# Patient Record
Sex: FEMALE | Race: WHITE | URBAN - METROPOLITAN AREA
[De-identification: names, ages, dates, MRNs, and addresses within clinical notes are randomized per-mention and may not be internally consistent; named-entity substitution may affect disease eponyms.]

---

## 2022-09-07 ENCOUNTER — TELEPHONE (OUTPATIENT)
Dept: BARIATRICS/WEIGHT MGMT | Age: 67
End: 2022-09-07

## 2022-10-13 ENCOUNTER — PREP FOR PROCEDURE (OUTPATIENT)
Dept: BARIATRICS/WEIGHT MGMT | Age: 67
End: 2022-10-13

## 2022-10-13 ENCOUNTER — TELEPHONE (OUTPATIENT)
Dept: BARIATRICS/WEIGHT MGMT | Age: 67
End: 2022-10-13

## 2022-10-13 ENCOUNTER — INITIAL CONSULT (OUTPATIENT)
Dept: BARIATRICS/WEIGHT MGMT | Age: 67
End: 2022-10-13
Payer: MEDICARE

## 2022-10-13 VITALS
BODY MASS INDEX: 39.55 KG/M2 | HEIGHT: 67 IN | TEMPERATURE: 97.7 F | SYSTOLIC BLOOD PRESSURE: 203 MMHG | RESPIRATION RATE: 20 BRPM | WEIGHT: 252 LBS | HEART RATE: 64 BPM | DIASTOLIC BLOOD PRESSURE: 107 MMHG

## 2022-10-13 DIAGNOSIS — E66.01 MORBID OBESITY DUE TO EXCESS CALORIES (HCC): ICD-10-CM

## 2022-10-13 DIAGNOSIS — K30 INDIGESTION: Primary | ICD-10-CM

## 2022-10-13 DIAGNOSIS — K21.9 GASTROESOPHAGEAL REFLUX DISEASE WITHOUT ESOPHAGITIS: ICD-10-CM

## 2022-10-13 PROBLEM — I10 HTN (HYPERTENSION): Status: ACTIVE | Noted: 2022-10-13

## 2022-10-13 PROBLEM — M19.90 ARTHRITIS: Status: ACTIVE | Noted: 2022-10-13

## 2022-10-13 PROCEDURE — G8484 FLU IMMUNIZE NO ADMIN: HCPCS | Performed by: SURGERY

## 2022-10-13 PROCEDURE — G8427 DOCREV CUR MEDS BY ELIG CLIN: HCPCS | Performed by: SURGERY

## 2022-10-13 PROCEDURE — 1090F PRES/ABSN URINE INCON ASSESS: CPT | Performed by: SURGERY

## 2022-10-13 PROCEDURE — 99204 OFFICE O/P NEW MOD 45 MIN: CPT | Performed by: SURGERY

## 2022-10-13 PROCEDURE — 99202 OFFICE O/P NEW SF 15 MIN: CPT

## 2022-10-13 PROCEDURE — G8417 CALC BMI ABV UP PARAM F/U: HCPCS | Performed by: SURGERY

## 2022-10-13 RX ORDER — OMEGA-3S/DHA/EPA/FISH OIL/D3 300MG-1000
400 CAPSULE ORAL DAILY
COMMUNITY

## 2022-10-13 RX ORDER — VALSARTAN 160 MG/1
TABLET ORAL
COMMUNITY
Start: 2022-10-02

## 2022-10-13 RX ORDER — SODIUM CHLORIDE, SODIUM LACTATE, POTASSIUM CHLORIDE, CALCIUM CHLORIDE 600; 310; 30; 20 MG/100ML; MG/100ML; MG/100ML; MG/100ML
INJECTION, SOLUTION INTRAVENOUS CONTINUOUS
OUTPATIENT
Start: 2022-10-13

## 2022-10-13 RX ORDER — IBUPROFEN 800 MG/1
TABLET ORAL
COMMUNITY
Start: 2022-10-05

## 2022-10-13 RX ORDER — CLONIDINE HYDROCHLORIDE 0.1 MG/1
TABLET ORAL 2 TIMES DAILY
COMMUNITY
Start: 2021-11-28

## 2022-10-13 RX ORDER — ASPIRIN 81 MG/81MG
CAPSULE ORAL DAILY
COMMUNITY

## 2022-10-13 NOTE — TELEPHONE ENCOUNTER
Prior Authorization Form  DEMOGRAPHICS:    Patient Name:  Marc Griffiths  Patient :  1955            Insurance:  Payor: [facility] MEDICARE / Plan: Yola Edgar COMPLETE / Product Type: *No Product type* /   Insurance ID Number:    Payer/Plan Subscr  Sex Relation Sub. Ins. ID Effective Group Num   1.  [facility] MEDICARE Bertha Neat 1955 Female Self [id number] 3/1/22                                    [address]         DIAGNOSIS & PROCEDURE:    Procedure/Operation: egd           CPT Code: 97831    Diagnosis:  gerd    ICD10 Code: k21.9    Location:  [facility]    Surgeon:  Ashlyn Bull INFORMATION:    Date: 22    Time:               Anesthesia:  MAC/TIVA                                                       Status:  Outpatient        Special Comments:         Electronically signed by Dniah Silva MA on 10/13/2022 at 1:55 PM

## 2022-10-13 NOTE — PROGRESS NOTES
Juan Ramon Ardon  10/13/2022  [facility]    Initial Evaluation  History and Physical   EGD       CHIEF COMPLAINT: Morbid obesity, malnutrition, Hypertension and Hyperlipidemia, arthritis, denies acid reflux    HISTORY OF PRESENT ILLNESS: Juan Ramon Ardon is a morbidly-obese 74 y.o.  female, who weighs 252 lb (114.3 kg). She is 89 pounds over her ideal body weight. The Body mass index is 39.47 kg/m². She has multiple medical problems aggravated by her obesity. She wishes to have bariatric surgery so that she can lose a large amount of weight and keep the weight off. I have met with her in the [facility] where we discussed the surgery in great detail and went over the risks and benefits. She has watched our informational video so she understands all of the extensive risks involved. She states that she understands all of the risks and wishes to proceed with the evaluation. Weight:  252 lb 10/13/2022  initial    Past medical history:  Htn, arthritis, morbid obesity    Past Surgical History:   Procedure Laterality Date    CHOLECYSTECTOMY, LAPAROSCOPIC  2016    COLONOSCOPY  2018    HIATAL HERNIA REPAIR N/A 2019    HYSTERECTOMY, TOTAL ABDOMINAL (CERVIX REMOVED)  2011      Allergies: Patient has no known allergies. Home Medications  Prior to Visit Medications    Medication Sig Taking?  Authorizing Provider   Aspirin (VAZALORE) 81 MG CAPS Take by mouth daily Yes Historical Provider, MD   vitamin D3 (CHOLECALCIFEROL) 10 MCG (400 UNIT) TABS tablet Take 400 Units by mouth daily Yes Historical Provider, MD   cloNIDine (CATAPRES) 0.1 MG tablet 2 times daily Yes Historical Provider, MD   cyanocobalamin 100 MCG tablet Take 100 mcg by mouth daily Yes Historical Provider, MD   ibuprofen (ADVIL;MOTRIN) 800 MG tablet TAKE 1 TABLET BY MOUTH EVERY 8 HOURS AS NEEDED WITH FOOD OR MILK Yes Historical Provider, MD   valsartan (DIOVAN) 160 MG tablet TAKE 1 TABLET BY MOUTH ONCE DAILY Yes for the surgery.     Physician Signature: Electronically signed by Dr. Ritu Andersen MD    Send copy of H&P to PCPVanessa

## 2022-10-13 NOTE — PATIENT INSTRUCTIONS
What is the next step to proceed with weight loss surgery? Please be aware that any co-pays or deductibles may be requested prior to testing and / or procedures. You will need to schedule a psychological evaluation for weight loss surgery. Patients will be required to complete all psychological testing as required by the mental health provider. Patients must also follow all of the provider's recommendations before weight loss surgery can be scheduled. The evaluation must be done a standard way for weight loss surgery. We strongly recommend that you contact one of our preferred providers listed below to arrange this:      Shaunna Salinas, [facility]  [address] [place], [place]   [phone]    [facility] and [address]   [phone]    Dr. Sandra Cook, PhD    Morteza Tabares. [place], [place]    [phone]      You will also need to plan on attending a 2 hour nutrition class at the [facility] prior to your surgery. We will schedule this for you when we schedule your surgery. Please remember to have your labs drawn 10 days prior to your first scheduled dietary appointment. Please remember, that while we will submit your case to insurance for surgery authorization, it is your responsibility to know if your plan covers weight loss surgery and keep up-to-date with changes to your insurance coverage. We will do everything possible to help you get approved for weight loss surgery, but cannot guarantee an approval.     Please note that you will not be submitted to your insurance company until all pre-operative testing requirements are met.

## 2022-10-25 ENCOUNTER — INITIAL CONSULT (OUTPATIENT)
Dept: BARIATRICS/WEIGHT MGMT | Age: 67
End: 2022-10-25

## 2022-10-25 VITALS — HEIGHT: 67 IN | BODY MASS INDEX: 39.87 KG/M2 | WEIGHT: 254 LBS

## 2022-10-25 DIAGNOSIS — Z00.8 NUTRITIONAL ASSESSMENT: Primary | ICD-10-CM

## 2022-10-25 DIAGNOSIS — Z71.3 NUTRITIONAL COUNSELING: ICD-10-CM

## 2022-10-25 PROCEDURE — 99999 PR OFFICE/OUTPT VISIT,PROCEDURE ONLY: CPT | Performed by: SURGERY

## 2022-10-25 NOTE — PATIENT INSTRUCTIONS
EMILY MUN[facility]  Dietary Initial Appointment Patient Instructions    By: Deb Abad RD / GRACE  [phone]      At your initial dietary appointment you have received the following information packets:    Please be aware at each visit you have been instructed that in order for your insurance company to approve your surgery you must show a consistent weight loss of 2 lbs or greater at each visit. We can not guarantee an approval by your insurance company we can only provide the information given to us it is up to you the patient to show compliancy to your insurance company. If you do gain weight during your supervised weight loss counseling sessions insurance companies starting in 2018 are denying patients for not showing consistent weight loss results when part of a supervised weight loss counseling program. Pt was instructed on 10/25/22. Pt verbalized understanding. Low-Fat Diet  - Please be sure to begin your low-fat diet from today's date until your scheduled surgery date. If you are not at goal weight by your history and physical appointment with your surgeon your surgery will be cancelled. Goal Weight: 244 lbs BMI: 38.2  Low-Fat Diet Contract - Patient Acknowledge and Signed  Supplement List - Please be sure to be saving to purchase your 3 month supply of supplements. If you do not bring supplements to your history and physical appointment your surgery will be cancelled. Supplement Contract - Patient Acknowledge and Signed  Please be sure to take a daily Multi-vitamin from now until surgery to reduce your incidence of infection. If your insurance company requires additional dietary counseling you will be scheduled to see the dietitian the following month. If your psychological evaluation is completed and all your dietary requirements for your individual insurance company have been completed you will be submitted to insurance.  Please make sure to check with us to see if we have received your psychological evaluation. Please be aware that any co-pays or deductibles may be requested prior to testing and / or procedures. You will need to schedule a psychological evaluation for weight loss surgery. Patients will be required to complete all psychological testing as required by the psychologist. Patients must follow all of the psychologist's recommendations before weight loss surgery can be scheduled. The evaluation must be done a standard way for weight loss surgery. We strongly recommend that you contact one of our preferred providers listed below to arrange this:    José Miguel Wang, [facility]                                                              [address] [place] [address]                                                                                      [phone]     [facility]                                                                                                [phone]        Dr. Gabi Coombs, PhD                         Annabelle Hope. [facility]                                                                                              [phone]     You will also need to plan on attending a 2 hour nutrition class at the [facility] prior to your surgery. We will schedule this for you when we schedule your surgery. Please remember to have your labs drawn prior to your scheduled appointment to review the results of your testing. Please remember, that while we will submit your case to insurance for surgery authorization, it is your responsibility to know if your plan covers weight loss surgery and keep up-to-date with changes to your insurance coverage.   We will do everything possible to help you get approved for weight loss surgery, but cannot guarantee an approval.      Please note that you will not be submitted to your insurance company until all   pre-operative testing requirements are met. Please remember you need to schedule to attend one Support Group meeting held at the [facility] before surgery. This one meeting is mandatory. You can attend as many support group meetings before and after surgery. Support group meetings are held at the [facility] from 6:00 - 8:00pm 1st, and 3rd Tuesday of every month. See dates listed below. Each individual person has his / her own medical requirements that need fulfilled before being able to schedule bariatric surgery . Please finalize these requirements by contacting our Bariatric Nurse at [phone].

## 2022-10-25 NOTE — PROGRESS NOTES
[address] [address] [facility]:  Initial Nutrition Consultation    Today's Date: 10/25/2022  Patients Name:Eliana Stanley     Height: 5' 7\" (1.702 m)   Weight: 254 lb (115.2 kg)   IBW: 163 lbs  %IBW: 156%  Excess Weight: 91 lbs  BMI: Body mass index is 39.78 kg/m². Subjective Information:   Patient has tried multiple means of weight loss including diet and exercise in the past and has been unable to maintain a healthy weight. Pt states he / she has tried the following  - Atkins, Nutrisystem and Calorie Counting. Patients overall goal is to be able to lose weight with diet and exercise by changing lifestyle, habits and maintain a healthy weight for a lifetime. Are your currently Diabetic  - No    Most successful diet in the past? Calorie Counting  Length of time patient was on the diet listed above? Aug 2021  Weight lost on the diet listed above? 50 lbs  Patient stated he / she maintained his / her weight for the following time? 8 Month's    Patient takes the following vitamins, minerals and dietary supplements? No - I do not currently take a daily complete multi-vitamin. Kandice Stack / Andres Alicea has educated the patient that we recommend in order to decrease infection rates patient start a daily complete multi-vitamin from now until surgery if proceeding with surgery. Yes - I currently take a Vitamin D3, Vitamin B12 and Vitamin C - Pt is not sure the dose. Rd / Ld recommends the patient continue the following supplements from now until surgery. Patient consumes the following beverage daily? Water    Patient states he / she has the following problems:  no - Eating  no - Chewing  no - Swallowing  no - Nausea  no - Vomiting  no - Diarrhea  no - Constipation  no - Hair Changes  no - Missing Teeth  no - Wears Dentures  Food Allergies: no  -   Food Intolerances: no -     Pt identifies his / her eating habits as the following:     Eats some fruits and or vegetables daily.  Tries to eat healthy - whole wheat bread. Moderate to high intake of empty-calorie foods (sweets, salty / fatty foods). Yes - Past medically assisted weight loss history reviewed. Yes - Provided education regarding the basic role of nutrition in junction with Bariatric Surgery. Yes - Provided overview of required dietary and behavioral changes pre and post operatively. Yes - Stated / reinforced that changes in dietary behaviors are critical to successful, long term weight Loss. Patient is aware that in order to proceed with bariatric surgery he / she will need to follow a low-fat diet prior to surgery. Patient is aware that bariatric surgery is a lifetime change that will require the patient to follow a low-fat, low-calorie, low-sugar, portion controlled diet with at least 30 minutes of physical activity daily. Patient is aware that certain foods may not be tolerated after bariatric surgery and certain foods will need avoided all together. Pt is aware supplementation of certain micro nutrients is lifelong along with the use of tracking both macro and micro nutrient intake daily after weight loss surgery. Pt is part of a comprehensive surgical weight loss program that is educating the patient on proper food choices, meal planning, portion control and label reading for use both before and after weight loss surgery. Pt is able to set measurable attainable nutrition goals that reinforce desired post-operative eating patterns when it comes to family, home based, or work settings. Mindful eating skills are being developed by the patient and how to identify the patients sense of hunger and fullness are being addressed.       [address] / LD feels that this patient knowledge / readiness to make appropriate diet / lifestyle changes assessed to be? - Good    RD / LD feels this patients expected adherence for post surgical diet? - Good    Patient was instructed and signed consents on a low-fat diet and required supplementation at initial consult. Comments: Patient is able to verbalize diet concepts for bariatric surgery. Patient is aware diet concepts will be reinforced at monthly pre-op weight loss counseling appointments. Pt will also attend a 3-hour nutrition education class once scheduled for surgery . RD / LD will monitor progress.

## 2022-11-03 ENCOUNTER — OFFICE VISIT (OUTPATIENT)
Dept: BARIATRICS/WEIGHT MGMT | Age: 67
End: 2022-11-03
Payer: MEDICARE

## 2022-11-03 VITALS
TEMPERATURE: 98.1 F | SYSTOLIC BLOOD PRESSURE: 180 MMHG | WEIGHT: 250 LBS | DIASTOLIC BLOOD PRESSURE: 110 MMHG | HEART RATE: 77 BPM | HEIGHT: 67 IN | RESPIRATION RATE: 20 BRPM | BODY MASS INDEX: 39.24 KG/M2

## 2022-11-03 DIAGNOSIS — E66.01 MORBID OBESITY DUE TO EXCESS CALORIES (HCC): Primary | ICD-10-CM

## 2022-11-03 DIAGNOSIS — Z01.818 PRE-OPERATIVE CLEARANCE: ICD-10-CM

## 2022-11-03 DIAGNOSIS — Z71.3 NUTRITIONAL COUNSELING: ICD-10-CM

## 2022-11-03 PROCEDURE — 3017F COLORECTAL CA SCREEN DOC REV: CPT | Performed by: NURSE PRACTITIONER

## 2022-11-03 PROCEDURE — 3077F SYST BP >= 140 MM HG: CPT | Performed by: NURSE PRACTITIONER

## 2022-11-03 PROCEDURE — 99213 OFFICE O/P EST LOW 20 MIN: CPT | Performed by: NURSE PRACTITIONER

## 2022-11-03 PROCEDURE — 1090F PRES/ABSN URINE INCON ASSESS: CPT | Performed by: NURSE PRACTITIONER

## 2022-11-03 PROCEDURE — G8400 PT W/DXA NO RESULTS DOC: HCPCS | Performed by: NURSE PRACTITIONER

## 2022-11-03 PROCEDURE — G8484 FLU IMMUNIZE NO ADMIN: HCPCS | Performed by: NURSE PRACTITIONER

## 2022-11-03 PROCEDURE — 99211 OFF/OP EST MAY X REQ PHY/QHP: CPT

## 2022-11-03 PROCEDURE — G8417 CALC BMI ABV UP PARAM F/U: HCPCS | Performed by: NURSE PRACTITIONER

## 2022-11-03 PROCEDURE — 1123F ACP DISCUSS/DSCN MKR DOCD: CPT | Performed by: NURSE PRACTITIONER

## 2022-11-03 PROCEDURE — 3080F DIAST BP >= 90 MM HG: CPT | Performed by: NURSE PRACTITIONER

## 2022-11-03 PROCEDURE — 1036F TOBACCO NON-USER: CPT | Performed by: NURSE PRACTITIONER

## 2022-11-03 PROCEDURE — G8427 DOCREV CUR MEDS BY ELIG CLIN: HCPCS | Performed by: NURSE PRACTITIONER

## 2022-11-03 NOTE — PROGRESS NOTES
Abdiel Coker  11/3/2022  Bariatric Weight loss appointment for Obesity  Nutrition Education Session      Subjective:   Abdiel Coker is a 74 y.o. female here for pre-surgical dietary education today. Patient is accompanied by herself at today's visit. Patient reports that they are doing good following their previous dietary education. Questions today include none. She reports that she did have diarrhea yesterday after eating something bad, she thinks that this is where her weight loss came from. Weight=250 lb (113.4 kg)  Today's weight represents a total weight loss to date of 5 pounds. Today we will discuss the topics of weight loss and protein intake. Patients previous 24 hour dietary recall includes:  Breakfast: 3 eggs, 2 pieces of toast, sausage consuelo  Snack: none  Lunch: salad - ranch, tomatoes and croutons  Snack: none  Dinner: 2 tacos in a low carb wrap  Snack: none  Water intake: 48  Other beverages: 2 cans of pepsi  Exercise: none        Prior to Admission medications    Medication Sig Start Date End Date Taking?  Authorizing Provider   Aspirin (VAZALORE) 81 MG CAPS Take by mouth daily   Yes Historical Provider, MD   vitamin D3 (CHOLECALCIFEROL) 10 MCG (400 UNIT) TABS tablet Take 400 Units by mouth daily   Yes Historical Provider, MD   cloNIDine (CATAPRES) 0.1 MG tablet 2 times daily 11/28/21  Yes Historical Provider, MD   cyanocobalamin 100 MCG tablet Take 100 mcg by mouth daily   Yes Historical Provider, MD   ibuprofen (ADVIL;MOTRIN) 800 MG tablet TAKE 1 TABLET BY MOUTH EVERY 8 HOURS AS NEEDED WITH FOOD OR MILK 10/5/22  Yes Historical Provider, MD   valsartan (DIOVAN) 160 MG tablet TAKE 1 TABLET BY MOUTH ONCE DAILY 10/2/22  Yes Historical Provider, MD       No Known Allergies  Past Medical History:   Diagnosis Date    Arthritis     HTN (hypertension)     Morbid obesity due to excess calories [facility])        Past Surgical History:   Procedure Laterality Date    CHOLECYSTECTOMY, LAPAROSCOPIC  2016 COLONOSCOPY  2018    HIATAL HERNIA REPAIR N/A 2019    HYSTERECTOMY, TOTAL ABDOMINAL (CERVIX REMOVED)  2011       Current Outpatient Medications   Medication Sig Dispense Refill    Aspirin (VAZALORE) 81 MG CAPS Take by mouth daily      vitamin D3 (CHOLECALCIFEROL) 10 MCG (400 UNIT) TABS tablet Take 400 Units by mouth daily      cloNIDine (CATAPRES) 0.1 MG tablet 2 times daily      cyanocobalamin 100 MCG tablet Take 100 mcg by mouth daily      ibuprofen (ADVIL;MOTRIN) 800 MG tablet TAKE 1 TABLET BY MOUTH EVERY 8 HOURS AS NEEDED WITH FOOD OR MILK      valsartan (DIOVAN) 160 MG tablet TAKE 1 TABLET BY MOUTH ONCE DAILY       No current facility-administered medications for this visit. No Known Allergies    History reviewed. No pertinent family history. Social History     Socioeconomic History    Marital status: Unknown     Spouse name: Not on file    Number of children: Not on file    Years of education: Not on file    Highest education level: Not on file   Occupational History    Not on file   Tobacco Use    Smoking status: Former     Types: Cigarettes    Smokeless tobacco: Former   Substance and Sexual Activity    Alcohol use: Not on file    Drug use: Not on file    Sexual activity: Not on file   Other Topics Concern    Not on file   Social History Narrative    Not on file     Social Determinants of Health     Financial Resource Strain: Not on file   Food Insecurity: Not on file   Transportation Needs: Not on file   Physical Activity: Not on file   Stress: Not on file   Social Connections: Not on file   Intimate Partner Violence: Not on file   Housing Stability: Not on file           A complete 10 system review was performed and are otherwise negative unless mentioned in the above HPI. Specific negatives are listed below but may not include all those reviewed.     General ROS: negative obtundation, AMS  ENT ROS: negative rhinorrhea, epistaxis  Allergy and Immunology ROS: negative itchy/watery eyes or nasal congestion  Hematological and Lymphatic ROS: negative spontaneous bleeding or bruising  Endocrine ROS: negative  lethargy, mood swings, palpitations or polydipsia/polyuria  Respiratory ROS: negative sputum changes, stridor, tachypnea or wheezing  Cardiovascular ROS: negative for - loss of consciousness, murmur or orthopnea  Gastrointestinal ROS: negative for - hematochezia or hematemesis  Genito-Urinary ROS: negative for -  genital discharge or hematuria  Musculoskeletal ROS: negative for - focal weakness, gangrene  Psych/Neuro ROS: negative for - visual or auditory hallucinations, suicidal ideation    Physical exam:   BP (!) 180/110 (Site: Left Upper Arm)   Pulse 77   Temp 98.1 °F (36.7 °C) (Temporal)   Resp 20   Ht 5' 7\" (1.702 m)   Wt 250 lb (113.4 kg)   BMI 39.16 kg/m²     General appearance: AAO, NAD  Eyes: PERRL, EOMI, red conjunctiva  Lungs: Equal chest rise bilateral  Chest wall: atraumatic, no tenderness, no echymosis or abrasions  Heart: reg rate, no murmur  Abdomen: soft, nondistended, tender minimal, no hernias, no peritioneal signs  Extremities: full ROM all 4 ext, no gross motor or sensory deficits  Pulses: 2+ distal  Skin: warm and dry  Neurologic: spontanous eye opening, purposeful, follows complex commands  Psych: No tremor, no suicidal ideation, no hallucinations      Assessment:   1. Morbid obesity due to excess calories ([address]  See below    2. Nutritional counseling  Patient was instructed on types of protein supplements and usage of protein supplements before and after surgery. The goal of protein intake after bariatric surgery is 60-80 grams daily. Patient was able to verbalize the instruction of how to take the supplements and the importance of their use before and after surgery. 3. Pre-operative clearance    - Galilea Springer MD, Cardiology, [place]    Increase water intake to 64 ounces    2nd session of dietary education pre weight loss surgery.  Patient has 1 more sessions to attend. Plan:   Continue to keep food diet, bring to next appointment with Nurse Practitioner or RD/LD  Continue to read food labels and make smart food choices  Increase protein and fluid intake as discussed in the Patient Guide to Bariatric Surgery  Increase physical activity at home    I have spent 15 minutes educating patient on nutrition. All questions were answered to patients and family's satisfaction. They verbalize the importance of pre surgical weight loss at this time.      Provider Signature: Electronically signed by LINDSAY Knight CNP

## 2022-11-03 NOTE — PATIENT INSTRUCTIONS
Patient was instructed on types of protein supplements and usage of protein supplements before and after surgery. The goal of protein intake after bariatric surgery is 60-80 grams daily. Patient was able to verbalize the instruction of how to take the supplements and the importance of their use before and after surgery. Increase water intake to 64 ounces per day. What is the next step to proceed with weight loss surgery? Please be aware that any co-pays or deductibles may be requested prior to testing and / or procedures. You will need to schedule a psychological evaluation for weight loss surgery. Patients will be required to complete all psychological testing as required by the mental health provider. Patients must also follow all of the provider's recommendations before weight loss surgery can be scheduled. The evaluation must be done a standard way for weight loss surgery. We strongly recommend that you contact one of our preferred providers listed below to arrange this:      Shaunna Singleton, [facility]  [address] [place], [place]   [phone]    [facility] and [address]   [phone]    Dr. Irais Bradford, PhD    Mary Kyle. [place], [place]    [phone]      You will also need to plan on attending a 2 hour nutrition class at the [facility] prior to your surgery. We will schedule this for you when we schedule your surgery. Please remember to have your labs drawn 10 days prior to your first scheduled dietary appointment. Please remember, that while we will submit your case to insurance for surgery authorization, it is your responsibility to know if your plan covers weight loss surgery and keep up-to-date with changes to your insurance coverage.   We will do everything possible to help you get approved for weight loss surgery, but cannot guarantee an approval.     Please note that you will not be submitted to your insurance company until all pre-operative testing requirements are met.

## 2022-11-05 ENCOUNTER — ANESTHESIA EVENT (OUTPATIENT)
Dept: ENDOSCOPY | Age: 67
End: 2022-11-05
Payer: MEDICARE

## 2022-11-05 NOTE — ANESTHESIA PRE PROCEDURE
Department of Anesthesiology  Preprocedure Note       Name:  Juan Ramon Ardon   Age:  74 y.o.  :  1955                                          MRN:  [id number]         Date:  2022      Surgeon: Abigail Vazquez):  Alex Agustin MD    Procedure: Procedure(s):  EGD ESOPHAGOGASTRODUODENOSCOPY    Medications prior to admission:   Prior to Admission medications    Medication Sig Start Date End Date Taking? Authorizing Provider   Aspirin (VAZALORE) 81 MG CAPS Take by mouth daily    Historical Provider, MD   vitamin D3 (CHOLECALCIFEROL) 10 MCG (400 UNIT) TABS tablet Take 400 Units by mouth daily    Historical Provider, MD   cloNIDine (CATAPRES) 0.1 MG tablet 2 times daily 21   Historical Provider, MD   cyanocobalamin 100 MCG tablet Take 100 mcg by mouth daily    Historical Provider, MD   ibuprofen (ADVIL;MOTRIN) 800 MG tablet TAKE 1 TABLET BY MOUTH EVERY 8 HOURS AS NEEDED WITH FOOD OR MILK 10/5/22   Historical Provider, MD   valsartan (DIOVAN) 160 MG tablet TAKE 1 TABLET BY MOUTH ONCE DAILY 10/2/22   Historical Provider, MD       Current medications:    No current facility-administered medications for this encounter.      Current Outpatient Medications   Medication Sig Dispense Refill    Aspirin (VAZALORE) 81 MG CAPS Take by mouth daily      vitamin D3 (CHOLECALCIFEROL) 10 MCG (400 UNIT) TABS tablet Take 400 Units by mouth daily      cloNIDine (CATAPRES) 0.1 MG tablet 2 times daily      cyanocobalamin 100 MCG tablet Take 100 mcg by mouth daily      ibuprofen (ADVIL;MOTRIN) 800 MG tablet TAKE 1 TABLET BY MOUTH EVERY 8 HOURS AS NEEDED WITH FOOD OR MILK      valsartan (DIOVAN) 160 MG tablet TAKE 1 TABLET BY MOUTH ONCE DAILY         Allergies:  No Known Allergies    Problem List:    Patient Active Problem List   Diagnosis Code    Arthritis M19.90    HTN (hypertension) I10    Morbid obesity due to excess calories ([facility]) E66.01    Esophageal reflux K21.9       Past Medical History:        Diagnosis Date    Arthritis     HTN (hypertension)     Morbid obesity due to excess calories [facility])        Past Surgical History:        Procedure Laterality Date    CHOLECYSTECTOMY, LAPAROSCOPIC  2016    COLONOSCOPY  2018    HIATAL HERNIA REPAIR N/A 2019    HYSTERECTOMY, TOTAL ABDOMINAL (CERVIX REMOVED)  2011       Social History:    Social History     Tobacco Use    Smoking status: Former     Types: Cigarettes    Smokeless tobacco: Former   Substance Use Topics    Alcohol use: Not on file                                Counseling given: Not Answered      Vital Signs (Current): There were no vitals filed for this visit. BP Readings from Last 3 Encounters:   11/03/22 (!) 180/110   10/13/22 (!) 203/107       NPO Status:                                                                                 BMI:   Wt Readings from Last 3 Encounters:   11/03/22 250 lb (113.4 kg)   10/25/22 254 lb (115.2 kg)   10/13/22 252 lb (114.3 kg)     There is no height or weight on file to calculate BMI.    CBC: No results found for: WBC, RBC, HGB, HCT, MCV, RDW, PLT    CMP: No results found for: NA, K, CL, CO2, BUN, CREATININE, GFRAA, AGRATIO, LABGLOM, GLUCOSE, GLU, PROT, CALCIUM, BILITOT, ALKPHOS, AST, ALT    POC Tests: No results for input(s): POCGLU, POCNA, POCK, POCCL, POCBUN, POCHEMO, POCHCT in the last 72 hours.     Coags: No results found for: PROTIME, INR, APTT    HCG (If Applicable): No results found for: PREGTESTUR, PREGSERUM, HCG, HCGQUANT     ABGs: No results found for: PHART, PO2ART, GGO7KHX, FLS3ALC, BEART, C3OUKJAF     Type & Screen (If Applicable):  No results found for: LABABO, LABRH    Drug/Infectious Status (If Applicable):  No results found for: HIV, HEPCAB    COVID-19 Screening (If Applicable): No results found for: COVID19        Anesthesia Evaluation  Patient summary reviewed  Airway: Mallampati: III  TM distance: >3 FB   Neck ROM: full  Mouth opening: > = 3 FB   Dental: normal exam Pulmonary: breath sounds clear to auscultation                            ROS comment: Smoking Status: Former   Cardiovascular:    (+) hypertension:, hyperlipidemia        Rhythm: regular  Rate: normal                    Neuro/Psych:   Negative Neuro/Psych ROS              GI/Hepatic/Renal:   (+) GERD:, morbid obesity          Endo/Other:    (+) : arthritis: OA., .                 Abdominal:   (+) obese (Morbidly obese),           Vascular: negative vascular ROS. Other Findings:           Anesthesia Plan      MAC     ASA 3       Induction: intravenous. Anesthetic plan and risks discussed with patient. Plan discussed with CRNA.                     Shivani Briceno MD   [phone]

## 2022-11-08 NOTE — PROGRESS NOTES
[address] [facility]                                                                                                                    PRE OP INSTRUCTIONS FOR  Ata Cover        Date: 11/8/2022    Date of surgery: 11/9/22   Arrival Time: Hospital will call you between 5pm and 7pm with your final arrival time for surgery    Do not eat or drink anything after midnight prior to surgery. This includes no water, chewing gum, mints or ice chips. Take the following medications with a small sip of water on the morning of Surgery: Clonidine     Diabetics may take evening dose of insulin but none after midnight. If you feel symptomatic or low blood sugar morning of surgery drink 1-2 ounces of apple juice only. Aspirin, Ibuprofen, Advil, Naproxen, Vitamin E and other Anti-inflammatory products should be stopped  before surgery  as directed by your physician. Take Tylenol only unless instructed otherwise by your surgeon. Check with your Doctor regarding stopping Plavix, Coumadin, Lovenox, Eliquis, Effient, or other blood thinners. Do not smoke,use illicit drugs and do not drink any alcoholic beverages 24 hours prior to surgery. You may brush your teeth the morning of surgery. DO NOT SWALLOW WATER    You MUST make arrangements for a responsible adult to take you home after your surgery. You will not be allowed to leave alone or drive yourself home. It is strongly suggested someone stay with you the first 24 hrs. Your surgery will be cancelled if you do not have a ride home. PEDIATRIC PATIENTS ONLY:  A parent/legal guardian must accompany a child scheduled for surgery and plan to stay at the hospital until the child is discharged. Please do not bring other children with you.     Please wear simple, loose fitting clothing to the hospital.  Sushma Marte not bring valuables (money, credit cards, checkbooks, etc.) Do not wear any makeup (including no eye makeup) or nail polish on your fingers or toes.    DO NOT wear any jewelry or piercings on day of surgery. All body piercing jewelry must be removed. Shower the night before surgery with _x__Antibacterial soap /VALENTE WIPES________    TOTAL JOINT REPLACEMENT/HYSTERECTOMY PATIENTS ONLY---Remember to bring Blood Bank bracelet to the hospital on the day of surgery. If you have a Living Will and Durable Power of  for Healthcare, please bring in a copy. If appropriate bring crutches, inspirex, WALKER, CANE etc... Notify your Surgeon if you develop any illness between now and surgery time, cough, cold, fever, sore throat, nausea, vomiting, etc.  Please notify your surgeon if you experience dizziness, shortness of breath or blurred vision between now & the time of your surgery. If you have ___dentures, they will be removed before going to the OR; we will provide you a container. If you wear ___contact lenses or ___glasses, they will be removed; please bring a case for them. To provide excellent care visitors will be limited to 2 in the room at any given time. Please bring picture ID and insurance card. Sleep apnea patients need to bring CPAP AND SETTINGS to hospital on day of surgery. During flu season no children under the age of 15 are permitted in the hospital for the safety of all patients. Other                   Please call AMBULATORY CARE if you have any further questions.    [address]     [address]

## 2022-11-09 ENCOUNTER — ANESTHESIA (OUTPATIENT)
Dept: ENDOSCOPY | Age: 67
End: 2022-11-09
Payer: MEDICARE

## 2022-11-09 ENCOUNTER — HOSPITAL ENCOUNTER (OUTPATIENT)
Age: 67
Setting detail: OUTPATIENT SURGERY
Discharge: HOME | End: 2022-11-09
Attending: SURGERY | Admitting: SURGERY
Payer: MEDICARE

## 2022-11-09 VITALS
SYSTOLIC BLOOD PRESSURE: 170 MMHG | HEART RATE: 78 BPM | DIASTOLIC BLOOD PRESSURE: 85 MMHG | WEIGHT: 249 LBS | TEMPERATURE: 97.2 F | BODY MASS INDEX: 39.08 KG/M2 | OXYGEN SATURATION: 96 % | RESPIRATION RATE: 16 BRPM | HEIGHT: 67 IN

## 2022-11-09 DIAGNOSIS — E66.01 MORBID OBESITY DUE TO EXCESS CALORIES (HCC): ICD-10-CM

## 2022-11-09 DIAGNOSIS — K21.9 ESOPHAGEAL REFLUX: ICD-10-CM

## 2022-11-09 LAB
ALBUMIN SERPL-MCNC: 4 G/DL (ref 3.5–5.2)
ALP BLD-CCNC: 77 U/L (ref 35–104)
ALT SERPL-CCNC: 15 U/L (ref 0–32)
ANION GAP SERPL CALCULATED.3IONS-SCNC: 9 MMOL/L (ref 7–16)
AST SERPL-CCNC: 15 U/L (ref 0–31)
BILIRUB SERPL-MCNC: 0.5 MG/DL (ref 0–1.2)
BUN BLDV-MCNC: 11 MG/DL (ref 6–23)
CALCIUM SERPL-MCNC: 9.1 MG/DL (ref 8.6–10.2)
CHLORIDE BLD-SCNC: 106 MMOL/L (ref 98–107)
CHOLESTEROL, TOTAL: 226 MG/DL (ref 0–199)
CO2: 24 MMOL/L (ref 22–29)
CREAT SERPL-MCNC: 0.6 MG/DL (ref 0.5–1)
FERRITIN: 189 NG/ML
FOLATE: 13.3 NG/ML (ref 4.8–24.2)
GFR SERPL CREATININE-BSD FRML MDRD: >60 ML/MIN/1.73
GLUCOSE BLD-MCNC: 87 MG/DL (ref 74–99)
HBA1C MFR BLD: 5.6 % (ref 4–5.6)
HCT VFR BLD CALC: 48.1 % (ref 34–48)
HEMOGLOBIN: 15.9 G/DL (ref 11.5–15.5)
MCH RBC QN AUTO: 31 PG (ref 26–35)
MCHC RBC AUTO-ENTMCNC: 33.1 % (ref 32–34.5)
MCV RBC AUTO: 93.8 FL (ref 80–99.9)
PDW BLD-RTO: 12.4 FL (ref 11.5–15)
PLATELET # BLD: 242 E9/L (ref 130–450)
PMV BLD AUTO: 9.2 FL (ref 7–12)
POTASSIUM SERPL-SCNC: 3.5 MMOL/L (ref 3.5–5)
PREALBUMIN: 26 MG/DL (ref 20–40)
RBC # BLD: 5.13 E12/L (ref 3.5–5.5)
SODIUM BLD-SCNC: 139 MMOL/L (ref 132–146)
TOTAL PROTEIN: 6.8 G/DL (ref 6.4–8.3)
TRIGL SERPL-MCNC: 128 MG/DL (ref 0–149)
VITAMIN B-12: 878 PG/ML (ref 211–946)
WBC # BLD: 6 E9/L (ref 4.5–11.5)

## 2022-11-09 PROCEDURE — 82465 ASSAY BLD/SERUM CHOLESTEROL: CPT

## 2022-11-09 PROCEDURE — 3609012400 HC EGD TRANSORAL BIOPSY SINGLE/MULTIPLE: Performed by: SURGERY

## 2022-11-09 PROCEDURE — 84630 ASSAY OF ZINC: CPT

## 2022-11-09 PROCEDURE — 36415 COLL VENOUS BLD VENIPUNCTURE: CPT

## 2022-11-09 PROCEDURE — 43239 EGD BIOPSY SINGLE/MULTIPLE: CPT | Performed by: SURGERY

## 2022-11-09 PROCEDURE — 82728 ASSAY OF FERRITIN: CPT

## 2022-11-09 PROCEDURE — 84425 ASSAY OF VITAMIN B-1: CPT

## 2022-11-09 PROCEDURE — 3700000000 HC ANESTHESIA ATTENDED CARE: Performed by: SURGERY

## 2022-11-09 PROCEDURE — 2709999900 HC NON-CHARGEABLE SUPPLY: Performed by: SURGERY

## 2022-11-09 PROCEDURE — 82607 VITAMIN B-12: CPT

## 2022-11-09 PROCEDURE — 83036 HEMOGLOBIN GLYCOSYLATED A1C: CPT

## 2022-11-09 PROCEDURE — 80053 COMPREHEN METABOLIC PANEL: CPT

## 2022-11-09 PROCEDURE — 84478 ASSAY OF TRIGLYCERIDES: CPT

## 2022-11-09 PROCEDURE — 87081 CULTURE SCREEN ONLY: CPT

## 2022-11-09 PROCEDURE — 84134 ASSAY OF PREALBUMIN: CPT

## 2022-11-09 PROCEDURE — 7100000011 HC PHASE II RECOVERY - ADDTL 15 MIN: Performed by: SURGERY

## 2022-11-09 PROCEDURE — 2580000003 HC RX 258: Performed by: NURSE ANESTHETIST, CERTIFIED REGISTERED

## 2022-11-09 PROCEDURE — 6360000002 HC RX W HCPCS: Performed by: NURSE ANESTHETIST, CERTIFIED REGISTERED

## 2022-11-09 PROCEDURE — 82746 ASSAY OF FOLIC ACID SERUM: CPT

## 2022-11-09 PROCEDURE — 7100000010 HC PHASE II RECOVERY - FIRST 15 MIN: Performed by: SURGERY

## 2022-11-09 PROCEDURE — 85027 COMPLETE CBC AUTOMATED: CPT

## 2022-11-09 RX ORDER — SODIUM CHLORIDE, SODIUM LACTATE, POTASSIUM CHLORIDE, CALCIUM CHLORIDE 600; 310; 30; 20 MG/100ML; MG/100ML; MG/100ML; MG/100ML
INJECTION, SOLUTION INTRAVENOUS CONTINUOUS PRN
Status: DISCONTINUED | OUTPATIENT
Start: 2022-11-09 | End: 2022-11-09 | Stop reason: SDUPTHER

## 2022-11-09 RX ORDER — PROPOFOL 10 MG/ML
INJECTION, EMULSION INTRAVENOUS PRN
Status: DISCONTINUED | OUTPATIENT
Start: 2022-11-09 | End: 2022-11-09 | Stop reason: SDUPTHER

## 2022-11-09 RX ORDER — SODIUM CHLORIDE, SODIUM LACTATE, POTASSIUM CHLORIDE, CALCIUM CHLORIDE 600; 310; 30; 20 MG/100ML; MG/100ML; MG/100ML; MG/100ML
INJECTION, SOLUTION INTRAVENOUS CONTINUOUS
Status: DISCONTINUED | OUTPATIENT
Start: 2022-11-09 | End: 2022-11-09 | Stop reason: HOSPADM

## 2022-11-09 RX ADMIN — PROPOFOL 150 MG: 10 INJECTION, EMULSION INTRAVENOUS at 13:08

## 2022-11-09 RX ADMIN — SODIUM CHLORIDE, POTASSIUM CHLORIDE, SODIUM LACTATE AND CALCIUM CHLORIDE: 600; 310; 30; 20 INJECTION, SOLUTION INTRAVENOUS at 12:58

## 2022-11-09 ASSESSMENT — PAIN - FUNCTIONAL ASSESSMENT: PAIN_FUNCTIONAL_ASSESSMENT: NONE - DENIES PAIN

## 2022-11-09 NOTE — H&P
Tory Forrester  11/9/2022  [facility]  History and Physical   EGD       CHIEF COMPLAINT: Morbid obesity, malnutrition, Hypertension and Hyperlipidemia, arthritis, denies acid reflux    HISTORY OF PRESENT ILLNESS: Tory Forrester is a morbidly-obese 74 y.o.  female, who weighs 249 lb (112.9 kg). She is 89 pounds over her ideal body weight. The Body mass index is 39 kg/m². She has multiple medical problems aggravated by her obesity. She wishes to have bariatric surgery so that she can lose a large amount of weight and keep the weight off. I have met with her in the [facility] where we discussed the surgery in great detail and went over the risks and benefits. She has watched our informational video so she understands all of the extensive risks involved. She states that she understands all of the risks and wishes to proceed with the evaluation. Weight:  252 lb 10/13/2022  initial    Past medical history:  Htn, arthritis, morbid obesity    Past Surgical History:   Procedure Laterality Date    CHOLECYSTECTOMY, LAPAROSCOPIC  2016    COLONOSCOPY  2018    HERNIA REPAIR      HIATAL HERNIA REPAIR N/A 2019    HYSTERECTOMY, TOTAL ABDOMINAL (CERVIX REMOVED)  2011      Allergies: Patient has no known allergies. Home Medications  Prior to Visit Medications    Medication Sig Taking?  Authorizing Provider   Aspirin (VAZALORE) 81 MG CAPS Take by mouth daily  Historical Provider, MD   vitamin D3 (CHOLECALCIFEROL) 10 MCG (400 UNIT) TABS tablet Take 400 Units by mouth daily  Historical Provider, MD   cloNIDine (CATAPRES) 0.1 MG tablet 2 times daily  Historical Provider, MD   cyanocobalamin 100 MCG tablet Take 100 mcg by mouth daily  Historical Provider, MD   ibuprofen (ADVIL;MOTRIN) 800 MG tablet TAKE 1 TABLET BY MOUTH EVERY 8 HOURS AS NEEDED WITH FOOD OR MILK  Historical Provider, MD   valsartan (DIOVAN) 160 MG tablet TAKE 1 TABLET BY MOUTH ONCE DAILY  Historical Provider, MD Social History:   TOBACCO:   reports that she has quit smoking. Her smoking use included cigarettes. She has quit using smokeless tobacco.  All smokers must join the free smoking cessation program and stop smoking for 3 months before having any Bariatric surgery. ETOH:    reports that she does not currently use alcohol. History reviewed. No pertinent family history. Review of Systems:  Psychiatric: deneis depression and anxiety  Respiratory: negative  Cardiovascular: negative  Gastrointestinal: negative  Musculoskeletal:negative  All others reviewed, negative    Physical Exam:   VITALS: Blood pressure (!) 142/84, pulse 63, temperature 97.5 °F (36.4 °C), temperature source Infrared, resp. rate 18, height 5' 7\" (1.702 m), weight 249 lb (112.9 kg), SpO2 98 %. General appearance: alert, appears stated age and cooperative, does ambulate easily  Head: Normocephalic, without obvious abnormality, atraumatic  Eyes: PERRL  Ears/mouth/throat:  Ears clear, mouth normal, throat no redness  Neck: no adenopathy, no JVD, supple, symmetrical, trachea midline and thyroid not enlarged  Lungs: clear to auscultation bilaterally  Heart: regular rate and rhythm  Abdomen: soft, non-tender; bowel sounds normal; no masses,  no organomegaly  Extremities: extremities normal, atraumatic, no cyanosis or edema  Skin: no open wounds    Assessment:  Morbid obesity, denies acid reflux, GB is out. Plan:  EGD to check for hiatal hernias, ulcers and H. Pylori.     Physician Signature: Electronically signed by Dr. Yenny Li MD    Send copy of H&P to PCP, Macrina Rae

## 2022-11-09 NOTE — PROGRESS NOTES
SBAR form completed. Placed on chart. Chart with patient in transit to [facility].

## 2022-11-09 NOTE — OP NOTE
[address] [facility]    Operative Report      SURGEON: Dr. Liliana Buck MD    Surgical Assistant: Tory Hogue RN     PRE-OP DIAGNOSIS:     Arthritis     HTN (hypertension)     Morbid obesity due to excess calories [facility])     POSTOPERATIVE DIAGNOSES:    EGD with biopsy 11/9/2022 showed no esophagitis, no hiatal hernia, there was no gastritis, biopsy done to check for H.pylori, she does not complain of acid reflux, and the gallbladder is out. OPERATION:    EGD esophagogastroduodenoscopy    with biopsy                 [id number]     ANESTHESIA: LMAC. BLOOD LOSS: none  SPECIMEN: gastric biopsy for FRENCH    CONSENT AND INDICATIONS:  Fatemeh Sepulveda is a 74y.o. year old female who weighs 249 lb (112.9 kg) who needs to have an EGD as part of the work up for bariatric surgery. I have discussed with the patient the indication, alternatives, and the possible risks and/or complications of the planned procedure and the anesthesia methods. The patient and/or patient representative appear to understand and agree to proceed. Monitoring and Safety: Anaesthesia monitored vital signs, BP cuff, pulse oximetry, cardiac monitor and level of consciousness until recovered. Operation: The patient was placed on the table and sedated by Anaesthesia. Bite block was placed. A lubricated scope was easily passed into the upper esophagus and distal esophagus which had no esophagitis. The Z line was measured at 36 cm. The scope was passed into the stomach and down toward the pylorus. The antral mucosa was examined and there was no gastritis. Biopsy was taken to check for H. pylori. The scope was passed through the pylorus into the duodenal bulb and distal duodenum which looked normal. The scope was pulled back to the stomach and retroflexed. There was no hiatal hernia, picture was taken. The scope was withdrawn. The patient tolerated the procedure well.     Complications: none    Plan:   She may proceed with bariatric surgery. Electronically signed Dr. Margy Castañeda M.D.

## 2022-11-09 NOTE — DISCHARGE INSTRUCTIONS
[address] [facility]  __________________________________________________    GI Endoscopy Discharge Instructions    Patient Name:  Brittany Sykes    Date: 11/9/2022  Time:  1:24 PM      FOLLOW ALL INSTRUCTIONS CAREFULLY - INCLUDING ALL BOXES THAT ARE CHECKED. You have had a(n): Esophageal gastroduodenoscopy _x__    with Biopsy _x__     RESTRICTIONS DO NOT DRIVE A CAR OR OPERATE MACHINERY FOR 24 HOURS.  (Your reflexes and coordination may be depressed form the medication you received during your procedure - even though you may not realize it.)    DIET:  _x__ Resume usual diet  ___ Dalia Ambrosia ___ Regular ___ Anti-reflux ___ High Fiber ___ Low Residue _x__ Drink extra fluids (non - alcoholic) for the next 24 hours. Other: ***      MEDICATIONS:***  Prescriptions given for medications as follows: ***  Refer to the medication handout. IF A POLYP HAS BEEN REMOVED - DO NOT TAKE ASPIRIN OR IBUPROFEN PRODUCTS FOR {number: 10} DAYS. TREATMENT FOR COMMON AFTER EFFECTS:   Sore throat - treat with throat lozenges and/or gargle with warm salt water. Mild abdominal pain, cramping, bloating, or excess gas - rest and eat lightly. SYMPTOMS TO WATCH FOR AND REPORT TO YOUR PHYSICiAN:   Chest Pain  Vomiting  Difficulty Breathing  Severe abdominal pain or severe bloating Large amount of rectal bleeding (small amounts can be normal after a procedure) Chills or fever (occurring within 24 hours after your procedure)    PHYSICIAN'S COMMENTS: ***     FOLLOW-UP CARE: Call to make an appointment for office visit in {number: 10}  weeks. Office # ***   If problems or questions arise, call your doctor. If you are unable to contact your doctor, [address] [place] [address] Emergency Room is available 24 hours a day.   ______________ Refer to your physician's special handout. I HAVE READ AND UNDERSTAND THE ABOVE INSTRUCTIONS. EMERGENCY DEPT.   SIGNATURE ___________________ RN/PHYSICIAN Christophe Dietrich ___________________ PATIENT  [phone]    SIGNATURE ___________________ RESPONSIBLE ADULT

## 2022-11-09 NOTE — ANESTHESIA POSTPROCEDURE EVALUATION
Department of Anesthesiology  Postprocedure Note    Patient: Francisco Castro  MRN: [id number]  YOB: 1955  Date of evaluation: 11/9/2022      Procedure Summary     Date: 11/09/22 Room / Location: [address] [place] [address] 01 / [facility]    Anesthesia Start: 9262 Anesthesia Stop: 6781    Procedure: EGD BIOPSY Diagnosis:       Esophageal reflux      (Esophageal reflux [K21.9])    Surgeons: Danisha Hewitt MD Responsible Provider: Aury Lu MD    Anesthesia Type: MAC ASA Status: 3          Anesthesia Type: No value filed.     Luh Phase I: Luh Score: 10    Luh Phase II: Luh Score: 9      Anesthesia Post Evaluation    Patient location during evaluation: bedside  Patient participation: complete - patient participated  Level of consciousness: awake  Pain score: 0  Airway patency: patent  Nausea & Vomiting: no nausea and no vomiting  Cardiovascular status: hemodynamically stable  Respiratory status: acceptable  Hydration status: euvolemic

## 2022-11-11 LAB — CLOTEST: NORMAL

## 2022-11-12 LAB — ZINC: 76.2 UG/DL (ref 60–120)

## 2022-11-16 LAB — VITAMIN B1 WHOLE BLOOD: 161 NMOL/L (ref 70–180)

## 2022-12-08 ENCOUNTER — INITIAL CONSULT (OUTPATIENT)
Dept: BARIATRICS/WEIGHT MGMT | Age: 67
End: 2022-12-08

## 2022-12-08 ENCOUNTER — TELEPHONE (OUTPATIENT)
Dept: BARIATRICS/WEIGHT MGMT | Age: 67
End: 2022-12-08

## 2022-12-08 VITALS — BODY MASS INDEX: 40.02 KG/M2 | WEIGHT: 255 LBS | HEIGHT: 67 IN

## 2022-12-08 DIAGNOSIS — Z13.89 SCREENING FOR SUBSTANCE ABUSE: ICD-10-CM

## 2022-12-08 DIAGNOSIS — Z02.6 ENCOUNTER FOR EXAMINATION FOR INSURANCE PURPOSES: Primary | ICD-10-CM

## 2022-12-08 DIAGNOSIS — Z87.891 FORMER SMOKER: ICD-10-CM

## 2022-12-08 DIAGNOSIS — Z71.3 NUTRITIONAL COUNSELING: ICD-10-CM

## 2022-12-08 DIAGNOSIS — Z00.8 NUTRITIONAL ASSESSMENT: Primary | ICD-10-CM

## 2022-12-08 NOTE — PROGRESS NOTES
Weight Loss Assessment: Completed by Nutrition Services RD/GRACE Certified in Adult Weight Management:  Phone Number:  [phone]  Fax Number:   [phone] Monty Serrano   12/8/22  Weight Loss Appointment: 3rd Weight Loss Appointment      Wt Readings from Last 3 Encounters:   12/08/22 255 lb (115.7 kg)   11/09/22 249 lb (112.9 kg)   11/03/22 250 lb (113.4 kg)        255 lb (115.7 kg)  IBW: 163 lbs         % IBW: 156%       % EBWL: 0%           ABW: 186 lbs  % ABW: 137%       BMI: Body mass index is 39.94 kg/m². Patient's 24 Hour Recall:  Breakfast: Bowl of Cheerio's with a Banana and Low-Fat Milk  Snack: None  Lunch: None  Snack: None  [facility]  Snack: Rice Kidbox Intake: 4 - 16.9 oz Bottles  Other Beverages: None  Exercise: ADL's - Walking / Gym: Pt is starting    Education:   1. Weigh yourself daily and record it. 2. Keep documented food records daily   3. 220-225 minutes a week of moderate physical activity   4. Just be more active in day to day routine   5. Higher protein intake and a higher fiber intake. Not a high protein or a high fiber diet just a higher intake. Fabrice Davis addressed the following with the pt:  - Fabrice Davis enc pt to comply with nutrition recommendations  - Fabrice Davis enc to go back to maintenance of regular physical activity  - Periodic assessment to prevent and treat eating or other psychiatric disorders   - Fabrice / Grace enc participation in support group meetings  - Fabrice Davis enc pt to go back to maintenance of daily food records and weight monitoring records   - Fabrice Davis reviewed the importance of adequate sleep and stress management  - Fabrice Davis reviewed nonfood strategies to cope with emotions and stress  - Fabrice Davis encouraged pt to practice the following: Mindful eating: Eating slowly:  Focusing on the eating experience without distraction  - Fabrice Davis enc. pt to pay attention to hunger and fullness cues  - Fabrice / Grace enc meal planning  - Fabrice / Grace  enc pt to chose nutrient dense whole foods instead of soft, high calorie foods  - Rd / Ld enc not drinking large amounts of fluids with or immediately after meals    Portion control, meal planning and avoiding empty calorie consumption. Weight loss goal for next follow-up appointment: 20 lbs    Patient has established the following three goals for the next follow-up appointment. 1.Pt states she wants to work on decreasing the volume of food she is consuming. Pt states he / she wants to work on decreasing the volume of food he / she is consuming daily. Pt states he / she is going to purchase a scale and measuring cups to start to weigh and measure all foods he / she is consuming. Pt is going to prepare meals and snacks ahead of time that are portion controlled. Pt is going to reduce his / her plate size to a saucer for meals to hep with head hunger and portion distortion. Pt states if he / she is being served a meal pt is going to cut the meals in half in places where he / she is not able to use a scale in order to reduce the volume of food he / she is consuming. Pt is encouraged to eat the meal slowly and chew all bites 30 - 35 times per bite in order to increase his / her feeling of fullness and satiety. Pt is also encouraged to use smaller silverware and serving utensils to reduce the volume of food he / she is consuming. Pt states he / she is going to keep accurate food records to help with accurate portion consumption. Portions before surgery pt can review the following reference - Serving Size versus Portion Size Is There a Difference - Academy of [employer] Dietetics. Portions after Weight Loss Surgery pt can review the following reference - Sample menus offered in the [facility] Diet Manual. Pt verbalized understanding. 2. Pt states she wants to increase water intake. Patient was instructed on the importance of increasing water intake to 48 - 64 oz. of water total daily.   Pt. was also instructed he / she is allowed an additional 30 oz. of sugar free caffeine free clear liquid beverages for a total of 90 oz. of fluid total daily. Pt. was able to verbalize how he / she can get more water and fluids within the diet. Pt. verbalized understanding. 3.Pt states she wants to decrease CHO intake. Patient has established the following exercise goal for next follow-up appointment:  ADL's - Gym 3 Day's a week. Duration: 60 Minutes    Did the patient keep food records:No - Pt was instructed by the Fabrice Davis that she / he needs to keep daily food records and bring the food records to all f/u appointments. Pt was instructed this is an important part of compliancy and long-term lifestyle changes and will help establish long-term weight loss and weight maintenance success after weight loss surgery. Fabrice Davis reviewed with the pt how to keep track of protein intake along with calories, fat and sugar content. Pt needs to be able to see that he / she is meeting his / her macro nutrient needs daily. Fabrice Davis reviewed different ways to keep foods records either manually or with computer apps. Pt was able to verbalize understanding. Failure to keep food records show poor compliancy following weight loss surgery. Pt has been given all the tools and education necessary to complete this task. Education spent with pt just on food records 20 minutes. Pt. is aware if they do not comply with The [address] and [place] [facility] Guidelines that this can lead to the patient being dismissed from the program.    The registered dietitian spent the following time 60 minutes educating the patient and providing the patient with nutritional handouts to follow. __________________________________________________________________________________  Primary Care Physician Follow-up:  Pt. was seen by Cierra Leahy RD/GRACE regarding weight loss education and follow-up on 12/8/22.  This was the patients 3rd appointment with the

## 2022-12-08 NOTE — TELEPHONE ENCOUNTER
Last Dietary Appointment Notes: 22    [address] [facility]: [facility] Dual Complete    Surgery Requested by Patient: As of 22 - RYGB    Date: 2 Hour Nutrition Class: Once all testing is complete    Rd / Ld reviewed the following with the patient:    Rd / Ld at the [facility] reviewed with the patient that he / she has not completed the following in order to proceed with bariatric surgery:     - Initial Appt with Surgeon was 10/13/22. Initial Appt is only good until 10/13/23. Testing will be required again after this date per your insurance company policy. Please remember just because you finished all of your requirements if you did not finish the requirements in a timely manner they can  and you can be required to complete these requirements over again. Each [employer] has its own set of requirements with its own set of deadlines. Once everything listed below is completed you will need to complete the following to proceed with sx. The [facility] will contact you to complete this process. You will be called in order to select your surgery date for insurance submission. You will be called and scheduled to attend a 3 Hour Nutrition Class on the type of surgery you are having completed. These are always scheduled on  from 8:00 am to 11:00 am and dates vary depending on the type of surgery you are having completed. You will need to purchase your bariatric supplements at this appointment cost is $140.00 to $240.00. Failure to purchase supplements or attend the class will lead to your surgery being cancelled. You will need final Medical Clearance from your Primary Care Physician. Failure to complete will lead to your surgery being cancelled. You will be scheduled for a H&P appointment with your surgeon . It is at this appointment you will need to make goal weight. Failure to complete will lead to your surgery being cancelled.     You will be scheduled for PAT at [address] usually the week before your scheduled surgery. Failure to complete will lead to your surgery being cancelled. Remember after all testing that is required it is your responsibility as a patient to call The [facility] to review that we received any testing results or requirements that you had completed. The [facility] is not responsible for tracking of results and testing. Your phone call will help facilitate if what is required was received and completed. - Nicotine Script Given 12/8/22 // Quite Date 12 years ago // Draw Date ASAP // Pt instructed to call 10 day's after to review results.     - Rd / Ld at the [facility] reviewed that he / she is not at his / her pre-surgery goal weight of 244 lbs. Patient currently weighs 255 lbs and must return to the [facility] for a weight check on H&P before the patient can be scheduled for surgery. This has been reviewed with the patient and the patient is in agreement. Rd / Ld reviewed with the patient that he / she must purchase a 3 month supply of supplements before his / her surgery or at the time of his / her H&P appointment and the patient states he / she is going to purchase the 3 month supply of supplements on H&P. Patient is aware that failure to purchase the supplements at this appointment will cancel the patients surgery date. Patient states at this time from the time of his / her initial consult here at the [facility] there has been no changes in his / her medical history. Patient is aware failure to disclose information can lead to his / her surgery being cancelled. Patient received a copy of this at the time of his / her final dietary consult.

## 2022-12-16 ENCOUNTER — SCHEDULED TELEPHONE ENCOUNTER (OUTPATIENT)
Dept: BARIATRICS/WEIGHT MGMT | Age: 67
End: 2022-12-16

## 2022-12-16 DIAGNOSIS — Z00.8 NUTRITIONAL ASSESSMENT: Primary | ICD-10-CM

## 2022-12-16 DIAGNOSIS — Z71.3 NUTRITIONAL COUNSELING: ICD-10-CM

## 2022-12-16 NOTE — PROGRESS NOTES
Fabrice Davis called the pt and scheduled the pt for the following. Pt is aware he/she needs to be down to their pre-op weight loss goal when they come in for their weight check or their sx will be cx. Pt verbalized understanding. Pre-op weight loss goal reviewed. Pt scheduled for the following see below. Pt is aware supplements are cash, check, credit or debt card. SX Type: RYGB  SX Date: ???  H&P Appt: Dr. Wilder Hall ???  Weight Check Appt: This will occur after the 3 hour class at the [facility]  3 Hour Class: At the [facility] From 8:00 - 11:00 am on - 1/4/23 - RYGB Class  Supplements: Pt was provided a handout on approved protein supplements for use after WLS. (Handout - Emailed 12/16/22)Pt was instructed he / she will need to bring his / her protein supplements to the class in order to move forward with sx or sx can be cx. Handouts reviewed and provided. Pt verbalized understanding. Pt will purchase the Bariatric approved MVI, Fe+, Ca+ and Vit D at the [facility]. 2 Hour Pt Education Book: Pt needs      Pt was instructed he / she can call any time with questions. Goal Weight 244 lbs - Pt has copy of pre-op diet. Enc pt to follow pre-op diet to get down to pre-op weight loss goal. Pt verbalized understanding.   Pt is aware sx can be cx by his / her surgeon at H&P appt if he / she feels pt has not achieved pre-op weight loss goal.

## 2022-12-19 ENCOUNTER — TELEPHONE (OUTPATIENT)
Dept: BARIATRICS/WEIGHT MGMT | Age: 67
End: 2022-12-19

## 2022-12-19 NOTE — LETTER
Date: 12-      [facility] Complete: Attention Prior Authorization    Regarding:  Roxanna Watters  Member ID:  [id number]    Request:     Authorization for CPT 27346  (Laparoscopic Amando-en-Y)                      Diagnosis Codes:  E66.01; I10; E78.5    Physician: Gayle Ramsay M.D.   ([facility] [id number])                (NPI [id number])    Facility: [facility] [id number])    [facility] (NPI [id number])    [address]       Dear Jocy Marlow or :     Pre-determination of insurance coverage, and authorization for hospitalization and surgical treatment are requested on behalf of your annuitant Roxanna Watters for diagnoses of morbid obesity, hypertension and hyperlipidemia. Roxanna Watters is 5'7, weighs 255 lb., and has a BMI of 39.9. She has been severely obese for a number of years despite many years of dietary efforts. She has lost weight through these efforts, however has been unable to maintain satisfactory weight loss. Roxanna Watters has been evaluated in our bariatric program and is felt to be an excellent candidate  for surgery. The patient has undergone extensive pre-operative education and understands all the risks, benefits and possible complications of surgery. She has also undergone thorough nutritional evaluation and counseling with our registered dietician. Our program provides long term nutritional counseling with unlimited consults with the dietician. All female patients have been educated on the importance of using reliable birth control and avoiding pregnancy for the first 2 years following bariatric surgery. All patients are nicotine tested and require a negative nicotine level prior to surgery. Patient has been educated about the risks associated with substance use, as well as the risks of using nicotine and alcohol after surgery.  Patient has been educated that these substances need to be avoided lifelong after surgery to reduce the risk of complications and sub-optimal weight loss. We are accredited as a [address] through [facility] and as such we have a multidisciplinary preoperative program. Shana Bright did participate and comply fully with the preoperative preparatory program which included surgical evaluation, cardiac workup and clearance, psychological evaluation and clearance, nutritional evaluation and 3 months of provider supervised dietary counseling. She has also attended pre-operative support group meetings facilitated by our LISW and is encouraged to continue attendance post-operatively. I am requesting authorization for Laparoscopic Amando-en-Y Gastric Bypass, procedure code 41879, with a hospital stay of 1 day, to be performed for the treatment of the patients severe and life threatening diseases. This procedure will be performed at [address] [place] [address], [address]. I appreciate your consideration in this matter and your timely response. Supporting clinical documents are included with this request.    Electronically signed by Dr. Karlos Wright M.D.   Bariatric Surgery

## 2022-12-19 NOTE — LETTER
Medical Clearance / Medical Necessity for Bariatric Surgery    Name: Ishmael Turcios     YOB: 1955    I have been caring for the above patient for _____ years. He/she suffers from the following medical conditions:  ____________________________________________________________________________________________________________________________________________    The above medical conditions are either caused by or worsened by the patients morbid obesity. Yes_________ No__________    The above medical conditions are currently stable:      Yes_________ No__________    The following medical conditions are difficult to manage/require multiple medications to achieve control due to the patients weight:   ____________________________________________________________________________________________________________________________________________                    The above patient has participated in the following medical weight loss efforts without long-term weight reduction:  ____________________________________________________________________________________________________________________________________________        I am in support of my patient undergoing a weight loss surgical procedure with [address] [place] [address] [facility] and the patient understands the risks and benefits of weight loss surgery. The patient has reasonable expectations and I believe the patient will be compliant with all post-surgical requirements. I understand the program is comprehensive with dedicated and specially trained staff. In the event that my patient is over the age of 61, I would like to state that the patients physiological age and co-morbid conditions result in a positive risk to benefit ratio.    _______  In my medical opinion, there are no medical contraindications to proceed with gastric sleeve surgery and patients benefits of bariatric surgery outweigh the risks of bariatric surgery.     ________ I understand that extended-release medications are not recommended after bariatric surgery and, if possible, I will change my patients medications to another option. If my patient is diabetic, we will establish a plan for medications and blood sugar checks for post-surgery. These medications may need to be adjusted or stopped after weight loss surgery.  The plan after surgery will be:  ________________________________________________________________________________________________________________________________________________________________________________________________________________________________________________________________________________________        If you do not currently manage your patients diabetic medications, what physician does? _____________________________________________________________________    Physician Name (Please Print): __________________________________    Primary Care Physicians Signature: __________________________________    Date: ______/______/______

## 2022-12-20 ENCOUNTER — TELEPHONE (OUTPATIENT)
Dept: BARIATRICS/WEIGHT MGMT | Age: 67
End: 2022-12-20

## 2022-12-20 NOTE — TELEPHONE ENCOUNTER
Received call from [employer] from [facility] who is helping patient get her coverage straightened out. Payam Ca was able to confirm that her coverage is not ending on 12- and he connected me with [facility] representative [facility] [place] [facility] who was able to start the prior auth for LRYGB on 2-. Clinicals were uploaded online [employer]. Pending Authur Herman number is [id number] and expected turnaround time is 5-14 calendar days to process. the request. Call ref number [id number], and Payam Ca will let the patient know this was successfully submitted today.

## 2023-01-03 ENCOUNTER — PREP FOR PROCEDURE (OUTPATIENT)
Dept: BARIATRICS/WEIGHT MGMT | Age: 68
End: 2023-01-03

## 2023-01-03 ENCOUNTER — TELEPHONE (OUTPATIENT)
Dept: BARIATRICS/WEIGHT MGMT | Age: 68
End: 2023-01-03

## 2023-01-03 NOTE — TELEPHONE ENCOUNTER
Per order of Dr. Fatemeh Scales patient is ready to be scheduled for LRYGB. Call placed to patient and she is in agreement with surgery on 2-13-23. Pre-op class is scheduled and patient knows she will need to purchase supplements at that visit. Pre-op letter will be mailed. She is working towards pre-op weight loss goal.   She does not smoke and will refrain from alcohol use. We reviewed at length all meds to avoid 2 weeks prior to surgery and patient voiced understanding. This list is in the pre-op letter which will be mailed to patient. Reviewed with patient instructions for Gatorade the night before surgery. Included written instructions with pre-op letter with consideration if patient is on insulin. She uses a CPAP with a setting of denies use. She has final medical clearance appointment on 12-28-22. Instructed to discuss with PCP extended release medications. Patient placed on [employer] calendar. Patient surgery ordered in [employer]. Patient denies latex allergy. Patient denies PONV.

## 2023-01-04 ENCOUNTER — INITIAL CONSULT (OUTPATIENT)
Dept: BARIATRICS/WEIGHT MGMT | Age: 68
End: 2023-01-04

## 2023-01-04 DIAGNOSIS — Z00.8 NUTRITIONAL ASSESSMENT: Primary | ICD-10-CM

## 2023-01-04 DIAGNOSIS — Z71.3 NUTRITIONAL COUNSELING: ICD-10-CM

## 2023-01-05 VITALS — HEIGHT: 68 IN | WEIGHT: 252 LBS | BODY MASS INDEX: 38.19 KG/M2

## 2023-01-05 NOTE — PATIENT INSTRUCTIONS
[facility]  Dietary Follow-up Appointment Instructions    Reeta Friday   Date: 1/5/2023     The RD / LD reviewed the following instructions with the patient and handouts have been given. Pt. is able to verbalize instruction and has been instructed to call with any problems or complications. If patient is not able to comply with the dietary or supplement instructions given pt. is instructed to call the [facility] at [phone]. Patient has been instructed to continue to follow a low-fat diet from today's date until the day before surgery. Patient has been instructed to drink 64 oz. of water daily eliminating carbonated and caffeinated beverages. ________________________________________________________________________  10% Of Excess Body Weight Requirement:    Since patient did not lose 10% of excess body weight the patient has been instructed to follow The VLCD Diet and return to the [facility] on H&P    At weigh in appointment patient must weigh 244 lbs  ________________________________________________________________________  3 Month Supply of Supplements:    Since patient did not purchase a protein supplement.   Patient needs to return to the [facility] on 2/2/23 with proof of 3 month supply of protein supplements.    ___________________________________________________________________________________________________________

## 2023-01-05 NOTE — PROGRESS NOTES
Patient attended a 3 Hour Initial Nutrition Consult Class for RYGB on 1/4/23. Patient was able to verbalize understanding of the class. [facility]  Nutrition History and Physical     Florecita Carr    Surgery Type: RYGB  Today's Date: 1/5/23    yes  Patient attended a 3 hour nutrition education class on 1/4/23, and was given written educational material.  Patient signed and verbalized understanding of nutrition therapy for Bariatric Surgery. Assessment:              Vitals:    01/05/23 1031   Weight: 252 lb (114.3 kg)   Height: 5' 7.5\" (1.715 m)    Height: 5' 7.5\" (1.715 m) Weight: 252 lb (114.3 kg)   BMI:Body mass index is 38.89 kg/m². Food Allergies and Allergies: No    Food Intolerances: No   Eating Problems:No  Chewing Problems:No  Swallowing Problems:No    Current Vitamins/Supplements and Medications:  Current Outpatient Medications:     Aspirin (VAZALORE) 81 MG CAPS, Take by mouth daily, Disp: , Rfl:     vitamin D3 (CHOLECALCIFEROL) 10 MCG (400 UNIT) TABS tablet, Take 400 Units by mouth daily, Disp: , Rfl:     cloNIDine (CATAPRES) 0.1 MG tablet, 2 times daily, Disp: , Rfl:     cyanocobalamin 100 MCG tablet, Take 100 mcg by mouth daily, Disp: , Rfl:     ibuprofen (ADVIL;MOTRIN) 800 MG tablet, TAKE 1 TABLET BY MOUTH EVERY 8 HOURS AS NEEDED WITH FOOD OR MILK, Disp: , Rfl:     valsartan (DIOVAN) 160 MG tablet, TAKE 1 TABLET BY MOUTH ONCE DAILY, Disp: , Rfl:   _    Pre-Operative Behavior Modification Nutrition Goals Pt Established: 180 Minutes of Education  - Fabrice Davis reviewed pt needs to follow a structured eating pattern pre-op and post-op. - Fabrice Davis reviewed pt needs to practice mindful eating pre-op and post-op vs mindless eating.  - Fabrice Davis reviewed pt needs to avoid drinking with meals and 30 minutes after meals.  - Fabrice Davis reviewed pt needs to eat in a designated area undistracted.   - Fabrice Davis reviewed self monitoring of eating and physical activity pre-op and post-op - Fluid, Food and PA logs daily.  - Fabrice Davis reviewed how pt needs to use a protein supplement post-op. Fabrice Davis reviewed the type of protein pt purchased is acceptable and reviewed how to  mix the protein supplement so pt can meet protein needs daily after wt loss sx. Pt was also able to witness other protein choices pts made in the class and how to mix those supplement to meet protein needs daily. See below pt compliance. - Fabrice Davis reviewed label reading for Added Sugars and Total Fat. Pt is aware he / she can consume no more than 2 grams of Added Sugars and meals should not contain more than 5 grams of Total Fat. Pt was also able to identify servings sizes and portions on a food label.  - Fabrice Davis reviewed Dumping Syndrome with RYGB patients and how to avoid Dumping Syndrome.  -  Fabrice Davis reviewed importance of meeting fluid needs daily. Goal is 48 - 64 oz of fluids total daily after wt loss sx which will increase at future f/u appointments. Signs and symptoms of dehydration reviewed. - Fabrice Davis reviewed Bariatric Clear [facility] Hospital Diet  - Using 1 oz medicine cups to track fluid intake  - Fabrice Davis reviewed importance of portion control, weighing and measuring everything along with slow eating habits.   - Pt is aware he / she needs to drink at a controlled rate and stop when full.   - Fabrice Davis reviewed Bariatric Clear Liquid Diet -  Sample menus provided  - Fabrice Davis reviewed Bariatric Full liquid Diet with timed sample menus for pts to follow post-op.   - Fabrice Davis reviewed importance of taking lifelong -  Bariatric MVI, Fe+, Ca+, Vitamin D Supplementation after WLS / LSG pts were also educated on this plus Vitamin B12 supplementation  - Fabrice Davis reviewed post-op f/u appointment schedule with lab draws.  - Fabrice Davis educated pt on post-op deficiencies following wt loss sx and pt can identify signs and symptoms of post-op deficiencies following weight loss sx.    - Fabrice Davis reviewed post-op supplement schedule pt will follow starting 2 weeks post-op. - Fabrice Davis reviewed signs and symptoms of post-op nutrition non-compliance. Pt can identify signs and symptoms that are created by non-compliance such as n/v, dehydration hair loss, diarrhea, constipation, muscle wasting and nutritional deficiencies etc.  - Pt received the Patient Guide To Bariatric Surgery. Fabrice Davis reviewed all information in this education manual. Pt verbalized understanding and had the opportunity to ask any questions during the class.   -Pt verbalized understanding off all the above. Please check all that apply:  No - Patient did not lost 10% of excess body weight prior to surgery  Yes - Patient  is able to verbalize a Bariatric Full Liquid Diet. Yes - Patient is able to verbalize the usage & importance of Protein Supplements. Yes- Patient purchased 3 month supply of vitamins and calcium. YES - Patient is instructed to follow a low-fat pre-op diet from now until surgery date. YES - Patient is instructed to take at least 30 grams of a protein supplement from  now until surgery date in addition to low-fat diet guidelines. YES - Patient is instructed to consume 64 ounces of water daily from now until surgery date.  ________________________________________________________________________  No - Patient did not lose 10% of excess body weight prior to surgery   - Patient has to follow the VLCD Diet and return to [facility] for weigh-in on H&P   - At weigh in appointment patient must weigh 244 lbs or surgery can be cancelled. ________________________________________________________________________  Yes - Patient did purchase 3 month supply of vitamins and Calcium. Comments:   [facility] Supplements - Pt did not purchase her protein supplement.  Pt will need to bring to H&P appt on 2/2/23

## 2023-02-02 ENCOUNTER — INITIAL CONSULT (OUTPATIENT)
Dept: BARIATRICS/WEIGHT MGMT | Age: 68
End: 2023-02-02

## 2023-02-02 ENCOUNTER — OFFICE VISIT (OUTPATIENT)
Dept: BARIATRICS/WEIGHT MGMT | Age: 68
End: 2023-02-02
Payer: MEDICARE

## 2023-02-02 VITALS
HEART RATE: 67 BPM | BODY MASS INDEX: 38.77 KG/M2 | RESPIRATION RATE: 20 BRPM | DIASTOLIC BLOOD PRESSURE: 102 MMHG | HEIGHT: 67 IN | SYSTOLIC BLOOD PRESSURE: 220 MMHG | TEMPERATURE: 97 F | WEIGHT: 247 LBS

## 2023-02-02 DIAGNOSIS — Z98.890 PONV (POSTOPERATIVE NAUSEA AND VOMITING): ICD-10-CM

## 2023-02-02 DIAGNOSIS — R11.2 PONV (POSTOPERATIVE NAUSEA AND VOMITING): ICD-10-CM

## 2023-02-02 DIAGNOSIS — K21.9 GASTROESOPHAGEAL REFLUX DISEASE WITHOUT ESOPHAGITIS: ICD-10-CM

## 2023-02-02 DIAGNOSIS — Z71.3 NUTRITIONAL COUNSELING: ICD-10-CM

## 2023-02-02 DIAGNOSIS — K91.2 MALNUTRITION FOLLOWING GASTROINTESTINAL SURGERY: Primary | ICD-10-CM

## 2023-02-02 DIAGNOSIS — Z00.8 NUTRITIONAL ASSESSMENT: Primary | ICD-10-CM

## 2023-02-02 PROCEDURE — 99214 OFFICE O/P EST MOD 30 MIN: CPT | Performed by: SURGERY

## 2023-02-02 PROCEDURE — 1090F PRES/ABSN URINE INCON ASSESS: CPT | Performed by: SURGERY

## 2023-02-02 PROCEDURE — G8427 DOCREV CUR MEDS BY ELIG CLIN: HCPCS | Performed by: SURGERY

## 2023-02-02 PROCEDURE — G8400 PT W/DXA NO RESULTS DOC: HCPCS | Performed by: SURGERY

## 2023-02-02 PROCEDURE — 3077F SYST BP >= 140 MM HG: CPT | Performed by: SURGERY

## 2023-02-02 PROCEDURE — 3017F COLORECTAL CA SCREEN DOC REV: CPT | Performed by: SURGERY

## 2023-02-02 PROCEDURE — 1123F ACP DISCUSS/DSCN MKR DOCD: CPT | Performed by: SURGERY

## 2023-02-02 PROCEDURE — 1036F TOBACCO NON-USER: CPT | Performed by: SURGERY

## 2023-02-02 PROCEDURE — G8417 CALC BMI ABV UP PARAM F/U: HCPCS | Performed by: SURGERY

## 2023-02-02 PROCEDURE — 3080F DIAST BP >= 90 MM HG: CPT | Performed by: SURGERY

## 2023-02-02 PROCEDURE — 99999 PR OFFICE/OUTPT VISIT,PROCEDURE ONLY: CPT | Performed by: DIETITIAN, REGISTERED

## 2023-02-02 PROCEDURE — G8484 FLU IMMUNIZE NO ADMIN: HCPCS | Performed by: SURGERY

## 2023-02-02 PROCEDURE — 99213 OFFICE O/P EST LOW 20 MIN: CPT

## 2023-02-02 RX ORDER — ONDANSETRON 4 MG/1
4 TABLET, ORALLY DISINTEGRATING ORAL EVERY 8 HOURS PRN
Qty: 15 TABLET | Refills: 0 | Status: SHIPPED | OUTPATIENT
Start: 2023-02-02

## 2023-02-02 RX ORDER — SUCRALFATE 1 G/1
0.5 TABLET ORAL 4 TIMES DAILY
Qty: 120 TABLET | Refills: 1 | Status: SHIPPED | OUTPATIENT
Start: 2023-02-02

## 2023-02-02 NOTE — PROGRESS NOTES
Pt was in the office today and Dr. Clyde Patton reviewed pts supplements and told the pt she could not use 2 of the 3 supplements she purchased. Pt does have the Isopure Infusion today which is approved for use. Fabrice Davis reviewed she can use the Isopure Infusion but she needs to use 3 scoops mixed in 12 oz of water divided into 4 meals 3 oz in size. Fabrice Davis reviewed she does not follow the instructions listed on the back of the container. Pt verbalized understanding.

## 2023-02-02 NOTE — PATIENT INSTRUCTIONS
Please follow the instruction sheets you received today for your pre-surgical skin and bowel prep. It is very important that your abdomen is properly cleaned and your bowel is cleansed of stool prior to surgery to decrease the chance of any complications. Make sure that you do not eat food or drink fluids after midnight prior to your surgery. If you eat or drink within 8 hours of your surgery, your procedure will be cancelled. Hold your medications as well unless you receive special instruction from either your surgeon or the anesthesiologist to take one of your medications with a small sip of water. Please be advised that most patients are now released from the hospital the day after surgery, regardless of procedure (Band, Bypass, or Sleeve), if they are progressing well and feel ready for discharge. You will see your surgeon prior to your hospital release so that he can examine you and discuss your discharge needs. Please call the [facility] with any questions you may have [phone]. Skin Prep    Home Instruction for Preoperative Antibacterial Dial Soap    Reason for using this soap before surgery:    - To decrease the potential for wound infection after surgery    How to use:    - Obtain Antibacterial Dial soap, either in bar or liquid form from your local store. The soap must be the Antibacterial type of Dial.    Unless you are allergic to this product or notice that it is causing skin irritation, wash with this soap from now until surgery. Make sure to shower with this soap the morning of your surgery before departing for the hospital.    Use a clean wash cloth or new body sponge. Wash from the neck down, paying particular attention to the abdominal area and belly button. Be gentle. DO NOT irritate or scrub the skin until red. Rinse thoroughly and pat dry with a clean towel. Dress with clean clothing.     Do not apply lotions or powders the morning of surgery.

## 2023-02-02 NOTE — PROGRESS NOTES
Madelin Rosen  2/2/2023  [facility]               History and Physical  Gastric Bypass (48926)     CHIEF COMPLAINT: Morbid obesity, Hypertension    HISTORY OF PRESENT ILLNESS: Madelin Rosen is a morbidly-obese 74 y.o.  female who weighs 247 lb (112 kg), Body mass index is 38.69 kg/m². She has multiple medical problems aggravated by her obesity. She wishes to have a gastric bypass so that she can lose more weight and keep the weight off. I have met with her on 3 different occasions in [facility], where we discussed the surgery in great detail and went over the risks and benefits. She has watched our informational video so she understands all of the extensive risks involved. She states that she understands all of these risks and wishes to proceed. Weights:  247 lb 2/2/2023   252 lb 10/13/2022       initial    Past Medical History:     Arthritis     HTN (hypertension)     Morbid obesity ([address]     Esophageal reflux    Past Surgical History:   Procedure Laterality Date    CHOLECYSTECTOMY, LAPAROSCOPIC  2016    COLONOSCOPY  2018    HERNIA REPAIR      HIATAL HERNIA REPAIR N/A 2019    HYSTERECTOMY, TOTAL ABDOMINAL (CERVIX REMOVED)  2011    UPPER GASTROINTESTINAL ENDOSCOPY N/A 11/9/2022    EGD BIOPSY performed by Zeynep Preston MD at [address] [place] [address]: Patient has no known allergies. Home Medications  Prior to Visit Medications    Medication Sig Taking?  Authorizing Provider   sucralfate (CARAFATE) 1 GM tablet Take 0.5 tablets by mouth 4 times daily Yes Zeynep Preston MD   ondansetron (ZOFRAN-ODT) 4 MG disintegrating tablet Take 1 tablet by mouth every 8 hours as needed for Nausea or Vomiting Yes Zeynep Preston MD   Aspirin (VAZALORE) 81 MG CAPS Take by mouth daily Yes Historical Provider, MD   vitamin D3 (CHOLECALCIFEROL) 10 MCG (400 UNIT) TABS tablet Take 400 Units by mouth daily Yes Historical Provider, MD   cloNIDine (CATAPRES) 0.1 MG tablet 2 times daily Yes Historical Provider, MD   cyanocobalamin 100 MCG tablet Take 100 mcg by mouth daily Yes Historical Provider, MD   ibuprofen (ADVIL;MOTRIN) 800 MG tablet TAKE 1 TABLET BY MOUTH EVERY 8 HOURS AS NEEDED WITH FOOD OR MILK Yes Historical Provider, MD   valsartan (DIOVAN) 160 MG tablet TAKE 1 TABLET BY MOUTH ONCE DAILY Yes Historical Provider, MD     Social History:   TOBACCO:   reports that she has quit smoking. Her smoking use included cigarettes. She has quit using smokeless tobacco.  All smokers must join the free smoking cessation program and stop smoking for 3 months before having any Bariatric surgery. ETOH:    reports that she does not currently use alcohol. No family history on file. Review of Systems:  Psychiatric: denies depression and anxiety  Respiratory: negative  Cardiovascular: negative  Gastrointestinal: negative  Musculoskeletal:negative  All others reviewed, negative    Physical Exam:   VITALS: Blood pressure (!) 220/102, pulse 67, temperature 97 °F (36.1 °C), temperature source Temporal, resp. rate 20, height 5' 7\" (1.702 m), weight 247 lb (112 kg). General appearance: alert, appears stated age and cooperative, does ambulate easily  Head: Normocephalic, without obvious abnormality, atraumatic  Eyes: PERRL  Ears/mouth/throat:  Ears clear, mouth normal, throat no redness  Neck: no adenopathy, no JVD, supple, symmetrical, trachea midline and thyroid not enlarged  Lungs: clear to auscultation bilaterally  Heart: regular rate and rhythm  Abdomen: soft, non-tender; bowel sounds normal; no masses,  no organomegaly  Extremities: extremities normal, atraumatic, no cyanosis or edema  Skin: no open wounds    Assessment:  Morbid obesity with failure of conservative therapy. Patient has been cleared psychologically and medically.   EGD with biopsy 11/9/2022 showed no esophagitis, no hiatal hernia, there was no gastritis, biopsy done to check for H.pylori was negative, she does not complain of acid reflux, and the gallbladder is out. The patient was informed that risks include, but are not limited to: death, anastomotic leak, bowel obstruction, bleeding, and sepsis. Any of these could require further surgery. Other risks include heart attack, DVT, PE, pneumonia, hernia, wound infection, the need for dilatations of the gastrojejunostomy, and the inability to lose appropriate weight and keep it off. We may not be able to do a Gastic Bypass due to unforseen scar tissue, in that case we might do a Sleeve Gastrectomy. We discussed that our goal is to ameliorate the medical problems and not to obtain a specific body mass index. She understands the risks and benefits and wishes to proceed with the procedure and has signed the consent form. She will go home with Acetaminophen for pain, Carafate 1/2 tablet every 4 hours for ulcer prophylaxis and Zofran and Scopolamine for nausea. Instructed to drink two 24 ounce bottles of G 2 the night before surgery and another 6 hours pre-op. Plan:  (11177) Amando-en-Y Gastric Bypass, robotic, possible open. She does not need Lovenox at home post-op.       Physician Signature: Electronically signed by Dr. Michaelle Orosco MD    Send copy of H&P to PCP, Elizabeth Bey

## 2023-02-09 ENCOUNTER — HOSPITAL ENCOUNTER (OUTPATIENT)
Dept: PREADMISSION TESTING | Age: 68
Discharge: HOME | End: 2023-02-09
Payer: MEDICARE

## 2023-02-09 VITALS
HEIGHT: 67 IN | OXYGEN SATURATION: 98 % | HEART RATE: 60 BPM | BODY MASS INDEX: 38.77 KG/M2 | TEMPERATURE: 98.3 F | RESPIRATION RATE: 18 BRPM | SYSTOLIC BLOOD PRESSURE: 159 MMHG | WEIGHT: 247 LBS | DIASTOLIC BLOOD PRESSURE: 80 MMHG

## 2023-02-09 DIAGNOSIS — Z01.818 PREOP TESTING: Primary | ICD-10-CM

## 2023-02-09 LAB
ALBUMIN SERPL-MCNC: 4 G/DL (ref 3.5–5.2)
ALP BLD-CCNC: 71 U/L (ref 35–104)
ALT SERPL-CCNC: 15 U/L (ref 0–32)
ANION GAP SERPL CALCULATED.3IONS-SCNC: 9 MMOL/L (ref 7–16)
AST SERPL-CCNC: 16 U/L (ref 0–31)
BILIRUB SERPL-MCNC: 0.4 MG/DL (ref 0–1.2)
BUN BLDV-MCNC: 18 MG/DL (ref 6–23)
CALCIUM SERPL-MCNC: 9.2 MG/DL (ref 8.6–10.2)
CHLORIDE BLD-SCNC: 109 MMOL/L (ref 98–107)
CO2: 25 MMOL/L (ref 22–29)
CREAT SERPL-MCNC: 0.7 MG/DL (ref 0.5–1)
GFR SERPL CREATININE-BSD FRML MDRD: >60 ML/MIN/1.73
GLUCOSE BLD-MCNC: 101 MG/DL (ref 74–99)
HCT VFR BLD CALC: 48.4 % (ref 34–48)
HEMOGLOBIN: 15.7 G/DL (ref 11.5–15.5)
MCH RBC QN AUTO: 30.4 PG (ref 26–35)
MCHC RBC AUTO-ENTMCNC: 32.4 % (ref 32–34.5)
MCV RBC AUTO: 93.6 FL (ref 80–99.9)
PDW BLD-RTO: 13.2 FL (ref 11.5–15)
PLATELET # BLD: 278 E9/L (ref 130–450)
PMV BLD AUTO: 9.5 FL (ref 7–12)
POTASSIUM REFLEX MAGNESIUM: 4.4 MMOL/L (ref 3.5–5)
RBC # BLD: 5.17 E12/L (ref 3.5–5.5)
SODIUM BLD-SCNC: 143 MMOL/L (ref 132–146)
TOTAL PROTEIN: 6.7 G/DL (ref 6.4–8.3)
WBC # BLD: 6.1 E9/L (ref 4.5–11.5)

## 2023-02-09 PROCEDURE — 80053 COMPREHEN METABOLIC PANEL: CPT

## 2023-02-09 PROCEDURE — 85027 COMPLETE CBC AUTOMATED: CPT

## 2023-02-09 PROCEDURE — 36415 COLL VENOUS BLD VENIPUNCTURE: CPT

## 2023-02-09 RX ORDER — MULTIVIT WITH MINERALS/LUTEIN
250 TABLET ORAL DAILY
COMMUNITY

## 2023-02-09 ASSESSMENT — PAIN SCALES - GENERAL: PAINLEVEL_OUTOF10: 0

## 2023-02-09 NOTE — PROGRESS NOTES
[address] [facility]                                                                                                                    PRE OP INSTRUCTIONS FOR  Garima Avelar        Date: 2/9/2023    Date of surgery: 2/13/23   Arrival Time: Hospital will call you between 5pm and 7pm with your final arrival time for surgery    Do not eat  anything after midnight prior to surgery. This includes no water, chewing gum, mints or ice chips. Follow dr Maribeth Disla liquid diet/ GATORADE instructions     Take the following medications with a small sip of water on the morning of Surgery: clonidine    Diabetics may take evening dose of insulin but none after midnight. If you feel symptomatic or low blood sugar morning of surgery drink 1-2 ounces of apple juice only. Aspirin, Ibuprofen, Advil, Naproxen, Vitamin E and other Anti-inflammatory products should be stopped  before surgery  as directed by your physician. Take Tylenol only unless instructed otherwise by your surgeon. Check with your Doctor regarding stopping Plavix, Coumadin, Lovenox, Eliquis, Effient, or other blood thinners. Do not smoke,use illicit drugs and do not drink any alcoholic beverages 24 hours prior to surgery. You may brush your teeth the morning of surgery. DO NOT SWALLOW WATER    You MUST make arrangements for a responsible adult to take you home after your surgery. You will not be allowed to leave alone or drive yourself home. It is strongly suggested someone stay with you the first 24 hrs. Your surgery will be cancelled if you do not have a ride home. PEDIATRIC PATIENTS ONLY:  A parent/legal guardian must accompany a child scheduled for surgery and plan to stay at the hospital until the child is discharged. Please do not bring other children with you.     Please wear simple, loose fitting clothing to the hospital.  [place] not bring valuables (money, credit cards, checkbooks, etc.) Do not wear any makeup (including no eye makeup) or nail polish on your fingers or toes. DO NOT wear any jewelry or piercings on day of surgery. All body piercing jewelry must be removed. Shower the night before surgery with ___Antibacterial soap /VALENTE WIPES___x_____        If you have a Living Will and Durable Power of  for Healthcare, please bring in a copy. If appropriate bring crutches, inspirex, WALKER, CANE etc... Notify your Surgeon if you develop any illness between now and surgery time, cough, cold, fever, sore throat, nausea, vomiting, etc.  Please notify your surgeon if you experience dizziness, shortness of breath or blurred vision between now & the time of your surgery. If you have ___dentures, they will be removed before going to the OR; we will provide you a container. If you wear ___contact lenses or ___glasses, they will be removed; please bring a case for them. To provide excellent care visitors will be limited to 1 in the room at any given time. Sleep apnea patients need to know CPAP  SETTINGS                                                                                          During flu season no children under the age of 15 are permitted in the hospital for the safety of all patients. Other                  Please call AMBULATORY CARE if you have any further questions.    [address] [facility]     [address]

## 2023-02-09 NOTE — PROGRESS NOTES
Per Willy Babinski, patients daughter may stay in patients room overnight [pt/daughter coming from [place].]

## 2023-02-09 NOTE — PROGRESS NOTES
[address] [facility]                                                                                                                    PRE OP INSTRUCTIONS FOR  Kristina Cabrera        Date: 2/9/2023    Date of surgery: 2/13/23   Arrival Time: Hospital will call you between 5pm and 7pm with your final arrival time for surgery    Do not eat  anything after midnight prior to surgery. This includes no water, chewing gum, mints or ice chips. Gastric bypass patients- The night before surgery drink 2- 24 oz bottles of regular Gatorade at bedtime. Drink both within 10-15 minutes. (Insulin dependent patients drink 2- 24 oz bottles of sugar free Gatorade.) May have liquids up to 6 hours prior to surgery. Take the following medications with a small sip of water on the morning of Surgery: clonidine     Diabetics may take evening dose of insulin but none after midnight. If you feel symptomatic or low blood sugar morning of surgery drink 1-2 ounces of apple juice only. Aspirin, Ibuprofen, Advil, Naproxen, Vitamin E and other Anti-inflammatory products should be stopped  before surgery  as directed by your physician. Take Tylenol only unless instructed otherwise by your surgeon. Check with your Doctor regarding stopping Plavix, Coumadin, Lovenox, Eliquis, Effient, or other blood thinners. Do not smoke,use illicit drugs and do not drink any alcoholic beverages 24 hours prior to surgery. You may brush your teeth the morning of surgery. DO NOT SWALLOW WATER    You MUST make arrangements for a responsible adult to take you home after your surgery. You will not be allowed to leave alone or drive yourself home. It is strongly suggested someone stay with you the first 24 hrs.  Your surgery will be cancelled if you do not have a ride home    Please wear simple, loose fitting clothing to the hospital.  Do not bring valuables (money, credit cards, checkbooks, etc.) Do not wear any makeup (including no eye makeup) or nail polish on your fingers or toes. DO NOT wear any jewelry or piercings on day of surgery. All body piercing jewelry must be removed. Shower the night before surgery with ___Antibacterial soap /VALENTE WIPES___x_____    If you have a Living Will and Durable Power of  for Healthcare, please bring in a copy. If appropriate bring crutches, inspirex, WALKER, CANE etc... Notify your Surgeon if you develop any illness between now and surgery time, cough, cold, fever, sore throat, nausea, vomiting, etc.  Please notify your surgeon if you experience dizziness, shortness of breath or blurred vision between now & the time of your surgery. If you have ___dentures, they will be removed before going to the OR; we will provide you a container. If you wear ___contact lenses or ___glasses, they will be removed; please bring a case for them. To provide excellent care visitors will be limited to 1 in the room at any given time. Sleep apnea patients need to know CPAP  SETTINGS                                                                                          During flu season no children under the age of 15 are permitted in the hospital for the safety of all patients. Other                  Please call AMBULATORY CARE if you have any further questions.    [address] [facility]     [address]

## 2023-02-10 ENCOUNTER — ANESTHESIA EVENT (OUTPATIENT)
Dept: OPERATING ROOM | Age: 68
DRG: 621 | End: 2023-02-10
Payer: MEDICARE

## 2023-02-10 ASSESSMENT — LIFESTYLE VARIABLES: SMOKING_STATUS: 0

## 2023-02-11 NOTE — ANESTHESIA PRE PROCEDURE
Department of Anesthesiology  Preprocedure Note       Name:  Adi Kearney   Age:  74 y.o.  :  1955                                          MRN:  [id number]         Date:  2/10/2023      Surgeon: Kayla Call):  Evette Ko MD    Procedure: Procedure(s):  GASTRIC BYPASS WILLA-EN-Y LAPAROSCOPIC ROBOTIC XI    Medications prior to admission:   Prior to Admission medications    Medication Sig Start Date End Date Taking?  Authorizing Provider   Ascorbic Acid (VITAMIN C) 250 MG tablet Take 250 mg by mouth daily    Historical Provider, MD   sucralfate (CARAFATE) 1 GM tablet Take 0.5 tablets by mouth 4 times daily 23   Evette Ko MD   ondansetron (ZOFRAN-ODT) 4 MG disintegrating tablet Take 1 tablet by mouth every 8 hours as needed for Nausea or Vomiting 23   Evette Ko MD   Aspirin (Lazarus Aida) 81 MG CAPS Take by mouth daily    Historical Provider, MD   vitamin D3 (CHOLECALCIFEROL) 10 MCG (400 UNIT) TABS tablet Take 400 Units by mouth daily    Historical Provider, MD   cloNIDine (CATAPRES) 0.1 MG tablet 2 times daily 21   Historical Provider, MD   cyanocobalamin 100 MCG tablet Take 100 mcg by mouth daily    Historical Provider, MD   valsartan (DIOVAN) 160 MG tablet at bedtime 10/2/22   Historical Provider, MD       Current medications:    Current Outpatient Medications   Medication Sig Dispense Refill    Ascorbic Acid (VITAMIN C) 250 MG tablet Take 250 mg by mouth daily      sucralfate (CARAFATE) 1 GM tablet Take 0.5 tablets by mouth 4 times daily 120 tablet 1    ondansetron (ZOFRAN-ODT) 4 MG disintegrating tablet Take 1 tablet by mouth every 8 hours as needed for Nausea or Vomiting 15 tablet 0    Aspirin (VAZALORE) 81 MG CAPS Take by mouth daily      vitamin D3 (CHOLECALCIFEROL) 10 MCG (400 UNIT) TABS tablet Take 400 Units by mouth daily      cloNIDine (CATAPRES) 0.1 MG tablet 2 times daily      cyanocobalamin 100 MCG tablet Take 100 mcg by mouth daily      valsartan (DIOVAN) 160 MG tablet at bedtime       No current facility-administered medications for this visit. Allergies:  No Known Allergies    Problem List:    Patient Active Problem List   Diagnosis Code    Arthritis M19.90    HTN (hypertension) I10    Morbid obesity due to excess calories ([facility] [address] E66.01    Esophageal reflux K21.9       Past Medical History:        Diagnosis Date    Arthritis     HTN (hypertension)     Morbid obesity due to excess calories [facility])        Past Surgical History:        Procedure Laterality Date    CHOLECYSTECTOMY, LAPAROSCOPIC  2016    COLONOSCOPY  2018    HERNIA REPAIR      HIATAL HERNIA REPAIR N/A 2019    HYSTERECTOMY, TOTAL ABDOMINAL (CERVIX REMOVED)  2011    UPPER GASTROINTESTINAL ENDOSCOPY N/A 11/9/2022    EGD BIOPSY performed by Venkatesh Villegas MD at [address] History:    Social History     Tobacco Use    Smoking status: Former     Types: Cigarettes    Smokeless tobacco: Former   Substance Use Topics    Alcohol use: Not Currently                                Counseling given: Not Answered      Vital Signs (Current): There were no vitals filed for this visit.                                            BP Readings from Last 3 Encounters:   02/09/23 (!) 159/80   02/02/23 (!) 220/102   11/09/22 (!) 170/85       NPO Status:                                                                                 BMI:   Wt Readings from Last 3 Encounters:   02/09/23 247 lb (112 kg)   02/02/23 247 lb (112 kg)   01/05/23 252 lb (114.3 kg)     There is no height or weight on file to calculate BMI.    CBC:   Lab Results   Component Value Date/Time    WBC 6.1 02/09/2023 10:55 AM    RBC 5.17 02/09/2023 10:55 AM    HGB 15.7 02/09/2023 10:55 AM    HCT 48.4 02/09/2023 10:55 AM    MCV 93.6 02/09/2023 10:55 AM    RDW 13.2 02/09/2023 10:55 AM     02/09/2023 10:55 AM       CMP:   Lab Results   Component Value Date/Time     02/09/2023 10:55 AM    K 4.4 02/09/2023 10:55 AM    CL 109 02/09/2023 10:55 AM    CO2 25 02/09/2023 10:55 AM    BUN 18 02/09/2023 10:55 AM    CREATININE 0.7 02/09/2023 10:55 AM    LABGLOM >60 02/09/2023 10:55 AM    GLUCOSE 101 02/09/2023 10:55 AM    PROT 6.7 02/09/2023 10:55 AM    CALCIUM 9.2 02/09/2023 10:55 AM    BILITOT 0.4 02/09/2023 10:55 AM    ALKPHOS 71 02/09/2023 10:55 AM    AST 16 02/09/2023 10:55 AM    ALT 15 02/09/2023 10:55 AM       POC Tests: No results for input(s): POCGLU, POCNA, POCK, POCCL, POCBUN, POCHEMO, POCHCT in the last 72 hours.     Coags: No results found for: PROTIME, INR, APTT    HCG (If Applicable): No results found for: PREGTESTUR, PREGSERUM, HCG, HCGQUANT     ABGs: No results found for: PHART, PO2ART, AQF6AZT, KVM8BKM, BEART, K3CHFDEX     Type & Screen (If Applicable):  No results found for: LABABO, LABRH    Drug/Infectious Status (If Applicable):  No results found for: HIV, HEPCAB    COVID-19 Screening (If Applicable): No results found for: COVID19        Anesthesia Evaluation  Patient summary reviewed and Nursing notes reviewed no history of anesthetic complications:   Airway: Mallampati: III  TM distance: >3 FB   Neck ROM: full  Mouth opening: > = 3 FB   Dental:          Pulmonary: breath sounds clear to auscultation      (-) COPD, asthma, sleep apnea and not a current smoker (Former)                           Cardiovascular:  Exercise tolerance: good (>4 METS),   (+) hypertension: moderate and severe, hyperlipidemia    (-) past MI, CAD, CABG/stent, dysrhythmias and  angina      Rhythm: regular  Rate: normal           Beta Blocker:  Not on Beta Blocker         Neuro/Psych:   Negative Neuro/Psych ROS     (-) seizures, TIA and CVA           GI/Hepatic/Renal:   (+) hiatal hernia (S/P repair), GERD: no interval change, morbid obesity (BMI 38.7 kg/m2)     (-) hepatitis and no renal disease       Endo/Other:    (+) : arthritis: OA., .    (-) diabetes mellitus, blood dyscrasia        Pt had no PAT visit       Abdominal:   (+) obese (Morbidly obese),           Vascular: negative vascular ROS. - DVT and PE. Other Findings:             Anesthesia Plan      MAC     ASA 3     (Note copied from a previous encounter with the appropriate addendums and changes  Modified RSI with HOB at 30 degrees RT  Pre-oxygenation x 3 minutes  20mg Ketamine  PONV prophylaxis)  Induction: intravenous. MIPS: Postoperative opioids intended and Prophylactic antiemetics administered. Anesthetic plan and risks discussed with patient. Plan discussed with CRNA.                 Lupe Caruso,    11-9-22

## 2023-02-13 ENCOUNTER — HOSPITAL ENCOUNTER (INPATIENT)
Age: 68
LOS: 1 days | Discharge: HOME | DRG: 621 | End: 2023-02-14
Attending: SURGERY | Admitting: SURGERY
Payer: MEDICARE

## 2023-02-13 ENCOUNTER — ANESTHESIA (OUTPATIENT)
Dept: OPERATING ROOM | Age: 68
DRG: 621 | End: 2023-02-13
Payer: MEDICARE

## 2023-02-13 DIAGNOSIS — E66.01 MORBID OBESITY (HCC): ICD-10-CM

## 2023-02-13 PROCEDURE — 2709999900 HC NON-CHARGEABLE SUPPLY: Performed by: SURGERY

## 2023-02-13 PROCEDURE — 3700000001 HC ADD 15 MINUTES (ANESTHESIA): Performed by: SURGERY

## 2023-02-13 PROCEDURE — 6360000002 HC RX W HCPCS: Performed by: SURGERY

## 2023-02-13 PROCEDURE — 7100000001 HC PACU RECOVERY - ADDTL 15 MIN: Performed by: SURGERY

## 2023-02-13 PROCEDURE — 3700000000 HC ANESTHESIA ATTENDED CARE: Performed by: SURGERY

## 2023-02-13 PROCEDURE — 2500000003 HC RX 250 WO HCPCS: Performed by: NURSE ANESTHETIST, CERTIFIED REGISTERED

## 2023-02-13 PROCEDURE — 8E0W4CZ ROBOTIC ASSISTED PROCEDURE OF TRUNK REGION, PERCUTANEOUS ENDOSCOPIC APPROACH: ICD-10-PCS | Performed by: SURGERY

## 2023-02-13 PROCEDURE — 1200000000 HC SEMI PRIVATE

## 2023-02-13 PROCEDURE — 0DJ08ZZ INSPECTION OF UPPER INTESTINAL TRACT, VIA NATURAL OR ARTIFICIAL OPENING ENDOSCOPIC: ICD-10-PCS | Performed by: SURGERY

## 2023-02-13 PROCEDURE — 6360000002 HC RX W HCPCS: Performed by: ANESTHESIOLOGY

## 2023-02-13 PROCEDURE — 2500000003 HC RX 250 WO HCPCS: Performed by: ANESTHESIOLOGY

## 2023-02-13 PROCEDURE — 6360000002 HC RX W HCPCS: Performed by: NURSE ANESTHETIST, CERTIFIED REGISTERED

## 2023-02-13 PROCEDURE — 0D164ZA BYPASS STOMACH TO JEJUNUM, PERCUTANEOUS ENDOSCOPIC APPROACH: ICD-10-PCS | Performed by: SURGERY

## 2023-02-13 PROCEDURE — 6370000000 HC RX 637 (ALT 250 FOR IP): Performed by: SURGERY

## 2023-02-13 PROCEDURE — 2700000000 HC OXYGEN THERAPY PER DAY

## 2023-02-13 PROCEDURE — 2580000003 HC RX 258: Performed by: SURGERY

## 2023-02-13 PROCEDURE — 3600000009 HC SURGERY ROBOT BASE: Performed by: SURGERY

## 2023-02-13 PROCEDURE — 2720000010 HC SURG SUPPLY STERILE: Performed by: SURGERY

## 2023-02-13 PROCEDURE — 3600000019 HC SURGERY ROBOT ADDTL 15MIN: Performed by: SURGERY

## 2023-02-13 PROCEDURE — 0WUF47Z SUPPLEMENT ABDOMINAL WALL WITH AUTOLOGOUS TISSUE SUBSTITUTE, PERCUTANEOUS ENDOSCOPIC APPROACH: ICD-10-PCS | Performed by: SURGERY

## 2023-02-13 PROCEDURE — 7100000000 HC PACU RECOVERY - FIRST 15 MIN: Performed by: SURGERY

## 2023-02-13 PROCEDURE — 2500000003 HC RX 250 WO HCPCS: Performed by: SURGERY

## 2023-02-13 PROCEDURE — 6360000002 HC RX W HCPCS

## 2023-02-13 PROCEDURE — S2900 ROBOTIC SURGICAL SYSTEM: HCPCS | Performed by: SURGERY

## 2023-02-13 RX ORDER — SCOLOPAMINE TRANSDERMAL SYSTEM 1 MG/1
1 PATCH, EXTENDED RELEASE TRANSDERMAL
Status: DISCONTINUED | OUTPATIENT
Start: 2023-02-13 | End: 2023-02-14 | Stop reason: HOSPADM

## 2023-02-13 RX ORDER — METHOCARBAMOL 100 MG/ML
INJECTION, SOLUTION INTRAMUSCULAR; INTRAVENOUS
Status: COMPLETED
Start: 2023-02-13 | End: 2023-02-13

## 2023-02-13 RX ORDER — SODIUM CHLORIDE, SODIUM LACTATE, POTASSIUM CHLORIDE, CALCIUM CHLORIDE 600; 310; 30; 20 MG/100ML; MG/100ML; MG/100ML; MG/100ML
INJECTION, SOLUTION INTRAVENOUS CONTINUOUS
Status: DISCONTINUED | OUTPATIENT
Start: 2023-02-13 | End: 2023-02-13 | Stop reason: HOSPADM

## 2023-02-13 RX ORDER — ACETAMINOPHEN 500 MG
1000 TABLET ORAL ONCE
Status: COMPLETED | OUTPATIENT
Start: 2023-02-13 | End: 2023-02-13

## 2023-02-13 RX ORDER — ROCURONIUM BROMIDE 10 MG/ML
INJECTION, SOLUTION INTRAVENOUS PRN
Status: DISCONTINUED | OUTPATIENT
Start: 2023-02-13 | End: 2023-02-13 | Stop reason: SDUPTHER

## 2023-02-13 RX ORDER — METOCLOPRAMIDE HYDROCHLORIDE 5 MG/ML
10 INJECTION INTRAMUSCULAR; INTRAVENOUS ONCE
Status: COMPLETED | OUTPATIENT
Start: 2023-02-13 | End: 2023-02-13

## 2023-02-13 RX ORDER — ONDANSETRON 2 MG/ML
INJECTION INTRAMUSCULAR; INTRAVENOUS
Status: COMPLETED
Start: 2023-02-13 | End: 2023-02-13

## 2023-02-13 RX ORDER — SODIUM CHLORIDE, SODIUM LACTATE, POTASSIUM CHLORIDE, CALCIUM CHLORIDE 600; 310; 30; 20 MG/100ML; MG/100ML; MG/100ML; MG/100ML
INJECTION, SOLUTION INTRAVENOUS CONTINUOUS
Status: DISCONTINUED | OUTPATIENT
Start: 2023-02-13 | End: 2023-02-13 | Stop reason: CLARIF

## 2023-02-13 RX ORDER — SCOLOPAMINE TRANSDERMAL SYSTEM 1 MG/1
1 PATCH, EXTENDED RELEASE TRANSDERMAL
Status: DISCONTINUED | OUTPATIENT
Start: 2023-02-13 | End: 2023-02-13

## 2023-02-13 RX ORDER — LABETALOL HYDROCHLORIDE 5 MG/ML
INJECTION, SOLUTION INTRAVENOUS PRN
Status: DISCONTINUED | OUTPATIENT
Start: 2023-02-13 | End: 2023-02-13 | Stop reason: SDUPTHER

## 2023-02-13 RX ORDER — OXYCODONE HYDROCHLORIDE 5 MG/1
10 TABLET ORAL EVERY 4 HOURS PRN
Status: DISCONTINUED | OUTPATIENT
Start: 2023-02-13 | End: 2023-02-14 | Stop reason: HOSPADM

## 2023-02-13 RX ORDER — MIDAZOLAM HYDROCHLORIDE 1 MG/ML
INJECTION INTRAMUSCULAR; INTRAVENOUS PRN
Status: DISCONTINUED | OUTPATIENT
Start: 2023-02-13 | End: 2023-02-13 | Stop reason: SDUPTHER

## 2023-02-13 RX ORDER — FAMOTIDINE 10 MG/ML
20 INJECTION, SOLUTION INTRAVENOUS ONCE
Status: COMPLETED | OUTPATIENT
Start: 2023-02-13 | End: 2023-02-13

## 2023-02-13 RX ORDER — NEOSTIGMINE METHYLSULFATE 1 MG/ML
INJECTION, SOLUTION INTRAVENOUS PRN
Status: DISCONTINUED | OUTPATIENT
Start: 2023-02-13 | End: 2023-02-13 | Stop reason: SDUPTHER

## 2023-02-13 RX ORDER — SODIUM CHLORIDE 9 MG/ML
INJECTION, SOLUTION INTRAVENOUS PRN
Status: DISCONTINUED | OUTPATIENT
Start: 2023-02-13 | End: 2023-02-13 | Stop reason: HOSPADM

## 2023-02-13 RX ORDER — PROCHLORPERAZINE EDISYLATE 5 MG/ML
5 INJECTION INTRAMUSCULAR; INTRAVENOUS
Status: COMPLETED | OUTPATIENT
Start: 2023-02-13 | End: 2023-02-13

## 2023-02-13 RX ORDER — OXYCODONE HYDROCHLORIDE 5 MG/1
5 TABLET ORAL EVERY 4 HOURS PRN
Status: DISCONTINUED | OUTPATIENT
Start: 2023-02-13 | End: 2023-02-14 | Stop reason: HOSPADM

## 2023-02-13 RX ORDER — CLONIDINE HYDROCHLORIDE 0.1 MG/1
0.1 TABLET ORAL 2 TIMES DAILY
Status: DISCONTINUED | OUTPATIENT
Start: 2023-02-13 | End: 2023-02-14 | Stop reason: HOSPADM

## 2023-02-13 RX ORDER — MAGNESIUM HYDROXIDE/ALUMINUM HYDROXICE/SIMETHICONE 120; 1200; 1200 MG/30ML; MG/30ML; MG/30ML
10 SUSPENSION ORAL EVERY 4 HOURS PRN
Status: DISCONTINUED | OUTPATIENT
Start: 2023-02-13 | End: 2023-02-14 | Stop reason: HOSPADM

## 2023-02-13 RX ORDER — METHOCARBAMOL 100 MG/ML
1000 INJECTION, SOLUTION INTRAMUSCULAR; INTRAVENOUS ONCE
Status: COMPLETED | OUTPATIENT
Start: 2023-02-13 | End: 2023-02-13

## 2023-02-13 RX ORDER — ALBUTEROL SULFATE 2.5 MG/3ML
2.5 SOLUTION RESPIRATORY (INHALATION) EVERY 4 HOURS PRN
Status: DISCONTINUED | OUTPATIENT
Start: 2023-02-13 | End: 2023-02-14 | Stop reason: HOSPADM

## 2023-02-13 RX ORDER — HALOPERIDOL 5 MG/ML
1 INJECTION INTRAMUSCULAR
Status: DISCONTINUED | OUTPATIENT
Start: 2023-02-13 | End: 2023-02-13

## 2023-02-13 RX ORDER — ENOXAPARIN SODIUM 100 MG/ML
40 INJECTION SUBCUTANEOUS DAILY
Status: DISCONTINUED | OUTPATIENT
Start: 2023-02-14 | End: 2023-02-14 | Stop reason: HOSPADM

## 2023-02-13 RX ORDER — PROMETHAZINE HYDROCHLORIDE 25 MG/1
25 SUPPOSITORY RECTAL EVERY 6 HOURS PRN
Status: DISCONTINUED | OUTPATIENT
Start: 2023-02-13 | End: 2023-02-14 | Stop reason: HOSPADM

## 2023-02-13 RX ORDER — KETOROLAC TROMETHAMINE 30 MG/ML
15 INJECTION, SOLUTION INTRAMUSCULAR; INTRAVENOUS ONCE
Status: COMPLETED | OUTPATIENT
Start: 2023-02-13 | End: 2023-02-13

## 2023-02-13 RX ORDER — HALOPERIDOL 5 MG/ML
1 INJECTION INTRAMUSCULAR
Status: COMPLETED | OUTPATIENT
Start: 2023-02-13 | End: 2023-02-13

## 2023-02-13 RX ORDER — VALSARTAN 80 MG/1
160 TABLET ORAL NIGHTLY
Status: DISCONTINUED | OUTPATIENT
Start: 2023-02-13 | End: 2023-02-14 | Stop reason: HOSPADM

## 2023-02-13 RX ORDER — PROCHLORPERAZINE EDISYLATE 5 MG/ML
10 INJECTION INTRAMUSCULAR; INTRAVENOUS EVERY 6 HOURS PRN
Status: DISCONTINUED | OUTPATIENT
Start: 2023-02-13 | End: 2023-02-14 | Stop reason: HOSPADM

## 2023-02-13 RX ORDER — DIPHENHYDRAMINE HYDROCHLORIDE 50 MG/ML
12.5 INJECTION INTRAMUSCULAR; INTRAVENOUS
Status: DISCONTINUED | OUTPATIENT
Start: 2023-02-13 | End: 2023-02-13 | Stop reason: HOSPADM

## 2023-02-13 RX ORDER — GLYCOPYRROLATE 0.2 MG/ML
INJECTION INTRAMUSCULAR; INTRAVENOUS PRN
Status: DISCONTINUED | OUTPATIENT
Start: 2023-02-13 | End: 2023-02-13 | Stop reason: SDUPTHER

## 2023-02-13 RX ORDER — SODIUM CHLORIDE 0.9 % (FLUSH) 0.9 %
5-40 SYRINGE (ML) INJECTION PRN
Status: DISCONTINUED | OUTPATIENT
Start: 2023-02-13 | End: 2023-02-13 | Stop reason: HOSPADM

## 2023-02-13 RX ORDER — LABETALOL HYDROCHLORIDE 5 MG/ML
5 INJECTION, SOLUTION INTRAVENOUS
Status: DISCONTINUED | OUTPATIENT
Start: 2023-02-13 | End: 2023-02-13 | Stop reason: HOSPADM

## 2023-02-13 RX ORDER — LIDOCAINE HYDROCHLORIDE 20 MG/ML
INJECTION, SOLUTION INTRAVENOUS PRN
Status: DISCONTINUED | OUTPATIENT
Start: 2023-02-13 | End: 2023-02-13 | Stop reason: SDUPTHER

## 2023-02-13 RX ORDER — SODIUM CHLORIDE 9 MG/ML
INJECTION, SOLUTION INTRAVENOUS PRN
Status: DISCONTINUED | OUTPATIENT
Start: 2023-02-13 | End: 2023-02-14 | Stop reason: HOSPADM

## 2023-02-13 RX ORDER — DEXAMETHASONE SODIUM PHOSPHATE 10 MG/ML
INJECTION, SOLUTION INTRAMUSCULAR; INTRAVENOUS PRN
Status: DISCONTINUED | OUTPATIENT
Start: 2023-02-13 | End: 2023-02-13 | Stop reason: SDUPTHER

## 2023-02-13 RX ORDER — ENOXAPARIN SODIUM 100 MG/ML
40 INJECTION SUBCUTANEOUS ONCE
Status: COMPLETED | OUTPATIENT
Start: 2023-02-13 | End: 2023-02-13

## 2023-02-13 RX ORDER — SODIUM CHLORIDE 0.9 % (FLUSH) 0.9 %
5-40 SYRINGE (ML) INJECTION PRN
Status: DISCONTINUED | OUTPATIENT
Start: 2023-02-13 | End: 2023-02-14 | Stop reason: HOSPADM

## 2023-02-13 RX ORDER — FENTANYL CITRATE 50 UG/ML
INJECTION, SOLUTION INTRAMUSCULAR; INTRAVENOUS PRN
Status: DISCONTINUED | OUTPATIENT
Start: 2023-02-13 | End: 2023-02-13 | Stop reason: SDUPTHER

## 2023-02-13 RX ORDER — ONDANSETRON 2 MG/ML
4 INJECTION INTRAMUSCULAR; INTRAVENOUS EVERY 6 HOURS PRN
Status: DISCONTINUED | OUTPATIENT
Start: 2023-02-13 | End: 2023-02-14 | Stop reason: HOSPADM

## 2023-02-13 RX ORDER — FENTANYL CITRATE 50 UG/ML
25 INJECTION, SOLUTION INTRAMUSCULAR; INTRAVENOUS EVERY 5 MIN PRN
Status: DISCONTINUED | OUTPATIENT
Start: 2023-02-13 | End: 2023-02-13 | Stop reason: HOSPADM

## 2023-02-13 RX ORDER — KETOROLAC TROMETHAMINE 15 MG/ML
15 INJECTION, SOLUTION INTRAMUSCULAR; INTRAVENOUS EVERY 6 HOURS
Status: DISCONTINUED | OUTPATIENT
Start: 2023-02-13 | End: 2023-02-14 | Stop reason: HOSPADM

## 2023-02-13 RX ORDER — SODIUM CHLORIDE 0.9 % (FLUSH) 0.9 %
5-40 SYRINGE (ML) INJECTION EVERY 12 HOURS SCHEDULED
Status: DISCONTINUED | OUTPATIENT
Start: 2023-02-13 | End: 2023-02-13 | Stop reason: HOSPADM

## 2023-02-13 RX ORDER — SODIUM CHLORIDE 0.9 % (FLUSH) 0.9 %
5-40 SYRINGE (ML) INJECTION EVERY 12 HOURS SCHEDULED
Status: DISCONTINUED | OUTPATIENT
Start: 2023-02-13 | End: 2023-02-14 | Stop reason: HOSPADM

## 2023-02-13 RX ORDER — ACETAMINOPHEN 160 MG/5ML
650 SUSPENSION, ORAL (FINAL DOSE FORM) ORAL EVERY 6 HOURS
Status: DISCONTINUED | OUTPATIENT
Start: 2023-02-13 | End: 2023-02-14 | Stop reason: HOSPADM

## 2023-02-13 RX ORDER — HALOPERIDOL 5 MG/ML
INJECTION INTRAMUSCULAR
Status: COMPLETED
Start: 2023-02-13 | End: 2023-02-13

## 2023-02-13 RX ORDER — MEPERIDINE HYDROCHLORIDE 25 MG/ML
12.5 INJECTION INTRAMUSCULAR; INTRAVENOUS; SUBCUTANEOUS ONCE
Status: DISCONTINUED | OUTPATIENT
Start: 2023-02-13 | End: 2023-02-13 | Stop reason: HOSPADM

## 2023-02-13 RX ORDER — MORPHINE SULFATE 4 MG/ML
4 INJECTION, SOLUTION INTRAMUSCULAR; INTRAVENOUS
Status: DISCONTINUED | OUTPATIENT
Start: 2023-02-13 | End: 2023-02-14 | Stop reason: HOSPADM

## 2023-02-13 RX ORDER — BUPIVACAINE HYDROCHLORIDE AND EPINEPHRINE 2.5; 5 MG/ML; UG/ML
INJECTION, SOLUTION EPIDURAL; INFILTRATION; INTRACAUDAL; PERINEURAL PRN
Status: DISCONTINUED | OUTPATIENT
Start: 2023-02-13 | End: 2023-02-13 | Stop reason: ALTCHOICE

## 2023-02-13 RX ORDER — KETAMINE HYDROCHLORIDE 50 MG/ML
INJECTION, SOLUTION, CONCENTRATE INTRAMUSCULAR; INTRAVENOUS PRN
Status: DISCONTINUED | OUTPATIENT
Start: 2023-02-13 | End: 2023-02-13 | Stop reason: SDUPTHER

## 2023-02-13 RX ORDER — PROPOFOL 10 MG/ML
INJECTION, EMULSION INTRAVENOUS PRN
Status: DISCONTINUED | OUTPATIENT
Start: 2023-02-13 | End: 2023-02-13 | Stop reason: SDUPTHER

## 2023-02-13 RX ORDER — MORPHINE SULFATE 2 MG/ML
2 INJECTION, SOLUTION INTRAMUSCULAR; INTRAVENOUS
Status: DISCONTINUED | OUTPATIENT
Start: 2023-02-13 | End: 2023-02-14 | Stop reason: HOSPADM

## 2023-02-13 RX ORDER — ONDANSETRON 2 MG/ML
4 INJECTION INTRAMUSCULAR; INTRAVENOUS
Status: COMPLETED | OUTPATIENT
Start: 2023-02-13 | End: 2023-02-13

## 2023-02-13 RX ORDER — HYDRALAZINE HYDROCHLORIDE 20 MG/ML
5 INJECTION INTRAMUSCULAR; INTRAVENOUS
Status: DISCONTINUED | OUTPATIENT
Start: 2023-02-13 | End: 2023-02-13 | Stop reason: HOSPADM

## 2023-02-13 RX ORDER — PROCHLORPERAZINE EDISYLATE 5 MG/ML
INJECTION INTRAMUSCULAR; INTRAVENOUS
Status: COMPLETED
Start: 2023-02-13 | End: 2023-02-13

## 2023-02-13 RX ORDER — SUCRALFATE 1 G/1
0.5 TABLET ORAL 4 TIMES DAILY
Status: DISCONTINUED | OUTPATIENT
Start: 2023-02-13 | End: 2023-02-14 | Stop reason: HOSPADM

## 2023-02-13 RX ADMIN — OXYCODONE 5 MG: 5 TABLET ORAL at 22:45

## 2023-02-13 RX ADMIN — HYDROMORPHONE HYDROCHLORIDE 0.5 MG: 1 INJECTION, SOLUTION INTRAMUSCULAR; INTRAVENOUS; SUBCUTANEOUS at 10:55

## 2023-02-13 RX ADMIN — HALOPERIDOL 1 MG: 5 INJECTION INTRAMUSCULAR at 11:17

## 2023-02-13 RX ADMIN — VALSARTAN 160 MG: 80 TABLET, FILM COATED ORAL at 20:38

## 2023-02-13 RX ADMIN — SODIUM CHLORIDE, PRESERVATIVE FREE 10 ML: 5 INJECTION INTRAVENOUS at 20:43

## 2023-02-13 RX ADMIN — CEFAZOLIN 2000 MG: 2 INJECTION, POWDER, FOR SOLUTION INTRAMUSCULAR; INTRAVENOUS at 08:01

## 2023-02-13 RX ADMIN — HALOPERIDOL LACTATE 1 MG: 5 INJECTION, SOLUTION INTRAMUSCULAR at 11:17

## 2023-02-13 RX ADMIN — KETOROLAC TROMETHAMINE 15 MG: 15 INJECTION, SOLUTION INTRAMUSCULAR; INTRAVENOUS at 18:09

## 2023-02-13 RX ADMIN — CLONIDINE HYDROCHLORIDE 0.1 MG: 0.1 TABLET ORAL at 20:39

## 2023-02-13 RX ADMIN — METHOCARBAMOL 1000 MG: 100 INJECTION, SOLUTION INTRAMUSCULAR; INTRAVENOUS at 10:32

## 2023-02-13 RX ADMIN — ACETAMINOPHEN 650 MG: 160 SUSPENSION ORAL at 18:09

## 2023-02-13 RX ADMIN — ONDANSETRON 4 MG: 2 INJECTION INTRAMUSCULAR; INTRAVENOUS at 11:12

## 2023-02-13 RX ADMIN — LIDOCAINE HYDROCHLORIDE 100 MG: 20 INJECTION, SOLUTION INTRAVENOUS at 07:57

## 2023-02-13 RX ADMIN — LABETALOL HYDROCHLORIDE 5 MG: 5 INJECTION INTRAVENOUS at 08:21

## 2023-02-13 RX ADMIN — KETAMINE HYDROCHLORIDE 20 MG: 50 INJECTION INTRAMUSCULAR; INTRAVENOUS at 07:57

## 2023-02-13 RX ADMIN — KETOROLAC TROMETHAMINE 15 MG: 30 INJECTION, SOLUTION INTRAMUSCULAR; INTRAVENOUS at 07:22

## 2023-02-13 RX ADMIN — PROPOFOL 160 MG: 10 INJECTION, EMULSION INTRAVENOUS at 07:57

## 2023-02-13 RX ADMIN — ENOXAPARIN SODIUM 40 MG: 100 INJECTION SUBCUTANEOUS at 13:12

## 2023-02-13 RX ADMIN — SUCRALFATE 0.5 G: 1 TABLET ORAL at 16:16

## 2023-02-13 RX ADMIN — FENTANYL CITRATE 50 MCG: 50 INJECTION, SOLUTION INTRAMUSCULAR; INTRAVENOUS at 09:33

## 2023-02-13 RX ADMIN — ACETAMINOPHEN 1000 MG: 500 TABLET, FILM COATED ORAL at 07:10

## 2023-02-13 RX ADMIN — FENTANYL CITRATE 100 MCG: 50 INJECTION, SOLUTION INTRAMUSCULAR; INTRAVENOUS at 07:57

## 2023-02-13 RX ADMIN — FENTANYL CITRATE 50 MCG: 50 INJECTION, SOLUTION INTRAMUSCULAR; INTRAVENOUS at 08:15

## 2023-02-13 RX ADMIN — SUCRALFATE 0.5 G: 1 TABLET ORAL at 20:39

## 2023-02-13 RX ADMIN — MIDAZOLAM 2 MG: 1 INJECTION INTRAMUSCULAR; INTRAVENOUS at 07:47

## 2023-02-13 RX ADMIN — SODIUM CHLORIDE, POTASSIUM CHLORIDE, SODIUM LACTATE AND CALCIUM CHLORIDE: 600; 310; 30; 20 INJECTION, SOLUTION INTRAVENOUS at 07:21

## 2023-02-13 RX ADMIN — POTASSIUM CHLORIDE: 2 INJECTION, SOLUTION, CONCENTRATE INTRAVENOUS at 22:59

## 2023-02-13 RX ADMIN — PROCHLORPERAZINE EDISYLATE 5 MG: 5 INJECTION INTRAMUSCULAR; INTRAVENOUS at 10:31

## 2023-02-13 RX ADMIN — FAMOTIDINE 20 MG: 10 INJECTION, SOLUTION INTRAVENOUS at 07:22

## 2023-02-13 RX ADMIN — POTASSIUM CHLORIDE: 2 INJECTION, SOLUTION, CONCENTRATE INTRAVENOUS at 14:45

## 2023-02-13 RX ADMIN — Medication 3 MG: at 09:51

## 2023-02-13 RX ADMIN — KETOROLAC TROMETHAMINE 15 MG: 15 INJECTION, SOLUTION INTRAMUSCULAR; INTRAVENOUS at 13:08

## 2023-02-13 RX ADMIN — GLYCOPYRROLATE 0.6 MG: 0.2 INJECTION INTRAMUSCULAR; INTRAVENOUS at 09:51

## 2023-02-13 RX ADMIN — ROCURONIUM BROMIDE 10 MG: 10 SOLUTION INTRAVENOUS at 09:31

## 2023-02-13 RX ADMIN — METOCLOPRAMIDE 10 MG: 5 INJECTION, SOLUTION INTRAMUSCULAR; INTRAVENOUS at 08:05

## 2023-02-13 RX ADMIN — Medication 0.5 MG: at 10:55

## 2023-02-13 RX ADMIN — ROCURONIUM BROMIDE 50 MG: 10 SOLUTION INTRAVENOUS at 07:57

## 2023-02-13 RX ADMIN — LABETALOL HYDROCHLORIDE 10 MG: 5 INJECTION INTRAVENOUS at 08:18

## 2023-02-13 RX ADMIN — DEXAMETHASONE SODIUM PHOSPHATE 10 MG: 10 INJECTION INTRAMUSCULAR; INTRAVENOUS at 08:13

## 2023-02-13 ASSESSMENT — PAIN SCALES - GENERAL
PAINLEVEL_OUTOF10: 4
PAINLEVEL_OUTOF10: 7
PAINLEVEL_OUTOF10: 7
PAINLEVEL_OUTOF10: 5

## 2023-02-13 ASSESSMENT — PAIN DESCRIPTION - DESCRIPTORS
DESCRIPTORS: SORE
DESCRIPTORS: ACHING;DULL;DISCOMFORT
DESCRIPTORS: ACHING;DULL;DISCOMFORT
DESCRIPTORS: SORE;DISCOMFORT;ACHING

## 2023-02-13 ASSESSMENT — PAIN DESCRIPTION - LOCATION
LOCATION: ABDOMEN
LOCATION: ABDOMEN;HEAD
LOCATION: ABDOMEN

## 2023-02-13 ASSESSMENT — PAIN DESCRIPTION - ORIENTATION
ORIENTATION: LOWER
ORIENTATION: MID

## 2023-02-13 ASSESSMENT — PAIN DESCRIPTION - PAIN TYPE
TYPE: SURGICAL PAIN
TYPE: SURGICAL PAIN

## 2023-02-13 ASSESSMENT — PAIN - FUNCTIONAL ASSESSMENT: PAIN_FUNCTIONAL_ASSESSMENT: 0-10

## 2023-02-13 NOTE — OP NOTE
[facility]    Operative Report    DATE OF PROCEDURE: 2/13/2023  SURGEON: Dr. Cheikh Sheikh MD, M.D. First Assistant: Kimberly Wylie     PREOPERATIVE DIAGNOSES:    Arthritis M19.90    HTN (hypertension) I10    Esophageal reflux K21.9    Morbid obesity (HCC) E66.01       POSTOPERATIVE DIAGNOSES:   Same      OPERATION:   1) Robotic chas-en-Y gastric bypass  2) Omental flap  3) EGD    ANESTHESIA: General endotracheal.   ESTIMATED BLOOD LOSS: 5 mL. SPECIMEN: jejunal tissue  COMPLICATIONS: None. HISTORY: Warren Ruiz is a morbidly-obese 74 y.o.  female, who weighs 244 lb (110.7 kg). The Body mass index is 38.22 kg/m². She has multiple medical problems aggravated by her obesity. She wishes to have a gastric bypass so that she can lose more weight and keep the weight off. She had a hiatal hernia repair with a band in 2019, does not know if she had a fundoplication. We discussed the extensive risks involved in the surgery. She understands a bypass may not be possible due to scar tissue. She also understands the risk of bleeding,  wound infections, hernias, risks of general anesthetic including MI, CVA, sudden death or reactions to anesthetic medications and the possibility of needing further procedures. The patient understood the risks. All questions were answered to the patient's satisfaction and they freely signed the consent and wished to proceed. PROCEDURE: The patient was placed on the table in the supine position and placed under general endotracheal anesthesia. She had SCDs on the legs as DVT prophylaxis. She had Ancef 3 g IV. Orogastric tube placed in the stomach, then removed. The abdomen was shaved, then prepped with ChloroPrep and draped in the usual sterile fashion. 0.25% Marcaine plus epinephrine was injected into the skin and muscle of each incision to help with postoperative pain control.       An 8 mm incision was made left of midline 35 cm below the xyphoid. A varies needle was used to insufflate the abdomen and an 8 mm robotic trocar was placed and an 8 mm 30 degree angled 3-D robot scope was used. A 12-mm incision was made in the right mid abdomen and a 12 mm robotic trocar was placed angled through the rectus sheath. 8-mm trocars were placed in the left mid and lateral abdomen and the table rotated right. The Vida retractor was placed below the xiphoid, there were moderate adhesions from omentum to the liver but the liver was not distended, the left lobe was retracted. The head was elevated 11 degrees and the robot was docked. Cadiere grasper on the right, camera above the umbilicus, vessel sealer on the left and Cadiere on the far left. The adhesions top the liver were taken down then the anterior fat pad was grasped and the angle of His dissected free. Adhesions from the fundus to the left Sena Cho were taken down. Pars flaccida clear area was opened with the vessel sealer and the lesser omental fat and neurovascular structures were ligated over to the lesser curvature of the stomach. The robotic 60 mm linear blue cartridge was fired across the lower stomach to start the pouch. A 36 F bougie was placed by Anaesthesia down to the staple line. The Vessel sealer was used to dissect the tunnel behind the stomach through to the opening at the angle of HIS. Two more firings were taken vertically out through an opening at the angle of HIS completely  the small pouch from the bypassed stomach. A 30 cm double armed Stratafix 0 Monocryl absorbable locking suture was placed through the 12 trocar, the Cadiere grasper replaced and the suture placed through the corner of the staple line. The omentum was retracted superiorly with the Vessel Sealer. The ligament of Treitz was identified and measured 150 cm and the Omega loop of jejunum was brought up anti colic anti gastric to the gastric pouch for an end to side anastomosis.     The absorbable locking suture was used to sew the jejunum to the gastric pouch forming the posterior wall of the anastomosis. The biliary limb was divided laterally with the robotic 60 mm white cartridge leaving the mesenteric vessels to the anastomosis. The far left Caudiere was removed and the hook cautery was used to make holes for the anastomosis, 10 mm holes in the jejunum and on the gastric pouch over the bougie. The bougie was advanced through the anastomosis into the jejunum. The anterior wall of the anastomosis was then closed with the other arm of the absorbable locking suture from the right and met from the left. Pouch size approximately 30 mL. One of the needles and the bougie were removed. The hook cautery was used to make a small hole in the divided jejunum 4 cm from the end. The small bowel was run 100 cm from the gastrojejunal anastomosis and a loop of jejunum was brought up for the distal anastomosis using the triple staple technique. The hook cautery was used to make a small opening in the other limb of jejunum. The hook was removed and the Caudiere grasper placed. The robotic 60 mm white cartridge was fired with one arm inside each lumen first proximally, then distally. The enterotomy was closed transversely with the 60 mm white cartridge, finishing the distal anastomosis, the stapler and specimen were removed through the 12 trocar and a 6 inch 0 V-lock absorbable locking suture and Caudiere grasper placed. The V lock was used to close the mesenteric defect to prevent internal hernias, needle removed. The far left robot arm was undocked and a bowel clamp was placed on the jejunum distal to the gastrojejunal anastomosis. Suction/ placed through the right trocar. Endoscope was passed down through the mouth. Old blood was removed with suction. The pouch was inflated with air. The pouch and anastomosis were covered with saline with the .  There was no bubbling from the suture or staple lines indicating they were airtight and watertight. The anastomosis was open approximately 8 mm. The scope was easily passed through the anastomosis into the jejunum. All of the mucosa looked healthy. The anastomosis looked good so the scope was withdrawn, fluid suctioned. Cadiere grasper placed through the 12 and omentum was sewn over the gastro-jejunal anastomosis as a gram patch using the end of the Stratafix suture, needle removed. The robot was undocked. The liver retractor was removed. All of the CO2 was released. The trocars were removed. Skin wounds were closed with 4-0 Monocryl dermal stitches. Skin-Afix glue was applied to each incision, no dressing. The needle and sponge count were correct. The patient tolerated the procedure well and went to recovery in stable condition. Plan: Will keep over night for pain and nausea control. Home tomorrow on Tylenol for pain, Zofran and Scopolamine for nausea, Carafate as ulcer prophylaxis.     Physician Signature: Electronically signed by Dr. Xuan Tan M.D.

## 2023-02-13 NOTE — PROGRESS NOTES
Dr. Dutta Fields [place] at bedside, pts upper abdomen slightly rounded. Per Luzmaria Ritchiee this was pts anatomy pre-operatively. VSS. Pt currently resting, NAD. Will continue to monitor.

## 2023-02-13 NOTE — H&P
Joanne Easton  2/13/2023  [facility]               History and Physical  Gastric Bypass (08610)     CHIEF COMPLAINT: Morbid obesity, Hypertension    HISTORY OF PRESENT ILLNESS: Joanne Easton is a morbidly-obese 74 y.o.  female who weighs 244 lb (110.7 kg), Body mass index is 38.22 kg/m². She has multiple medical problems aggravated by her obesity. She wishes to have a gastric bypass so that she can lose more weight and keep the weight off. I have met with her on [facility] different occasions [facility], where we discussed the surgery in great detail and went over the risks and benefits. She has watched our informational video so she understands all of the extensive risks involved. She states that she understands all of these risks and wishes to proceed. Weights:  244 lb 2/13/2023  bypass  252 lb 10/13/2022       initial    Past Medical History:     Arthritis     HTN (hypertension)     Morbid obesity ([address]     Esophageal reflux    Past Surgical History:   Procedure Laterality Date    CHOLECYSTECTOMY, LAPAROSCOPIC  2016    COLONOSCOPY  2018    HERNIA REPAIR      HIATAL HERNIA REPAIR N/A 2019    HYSTERECTOMY, TOTAL ABDOMINAL (CERVIX REMOVED)  2011    UPPER GASTROINTESTINAL ENDOSCOPY N/A 11/9/2022    EGD BIOPSY performed by Junior Dias MD at [address] [place] [address]: Patient has no known allergies. Home Medications  Prior to Visit Medications    Medication Sig Taking?  Authorizing Provider   Ascorbic Acid (VITAMIN C) 250 MG tablet Take 250 mg by mouth daily  Historical Provider, MD   sucralfate (CARAFATE) 1 GM tablet Take 0.5 tablets by mouth 4 times daily  Junior Dias MD   ondansetron (ZOFRAN-ODT) 4 MG disintegrating tablet Take 1 tablet by mouth every 8 hours as needed for Nausea or Vomiting  Junior Dias MD   Aspirin (VAZALORE) 81 MG CAPS Take by mouth daily  Historical Provider, MD   vitamin D3 (CHOLECALCIFEROL) 10 MCG (400 UNIT) TABS tablet Take 400 Units by mouth daily  Historical Provider, MD   cloNIDine (CATAPRES) 0.1 MG tablet 2 times daily  Historical Provider, MD   cyanocobalamin 100 MCG tablet Take 100 mcg by mouth daily  Historical Provider, MD   valsartan (DIOVAN) 160 MG tablet at bedtime  Historical Provider, MD     Social History:   TOBACCO:   reports that she has quit smoking. Her smoking use included cigarettes. She has quit using smokeless tobacco.  All smokers must join the free smoking cessation program and stop smoking for 3 months before having any Bariatric surgery. ETOH:    reports that she does not currently use alcohol. History reviewed. No pertinent family history. Review of Systems:  Psychiatric: denies depression and anxiety  Respiratory: negative  Cardiovascular: negative  Gastrointestinal: negative  Musculoskeletal:negative  All others reviewed, negative    Physical Exam:   VITALS: Blood pressure (!) 182/83, pulse 70, temperature 98 °F (36.7 °C), temperature source Infrared, resp. rate 18, height 5' 7\" (1.702 m), weight 244 lb (110.7 kg), SpO2 98 %. General appearance: alert, appears stated age and cooperative, does ambulate easily  Head: Normocephalic, without obvious abnormality, atraumatic  Eyes: PERRL  Ears/mouth/throat:  Ears clear, mouth normal, throat no redness  Neck: no adenopathy, no JVD, supple, symmetrical, trachea midline and thyroid not enlarged  Lungs: clear to auscultation bilaterally  Heart: regular rate and rhythm  Abdomen: soft, non-tender; bowel sounds normal; no masses,  no organomegaly  Extremities: extremities normal, atraumatic, no cyanosis or edema  Skin: no open wounds    Assessment:  Morbid obesity with failure of conservative therapy. Patient has been cleared psychologically and medically. EGD with biopsy 11/9/2022 showed no esophagitis, no hiatal hernia, there was no gastritis, biopsy done to check for H.pylori was negative, she does not complain of acid reflux, and the gallbladder is out.  The patient was informed that risks include, but are not limited to: death, anastomotic leak, bowel obstruction, bleeding, and sepsis. Any of these could require further surgery. Other risks include heart attack, DVT, PE, pneumonia, hernia, wound infection, the need for dilatations of the gastrojejunostomy, and the inability to lose appropriate weight and keep it off. We may not be able to do a Gastic Bypass due to unforseen scar tissue, in that case we might do a Sleeve Gastrectomy. We discussed that our goal is to ameliorate the medical problems and not to obtain a specific body mass index. She understands the risks and benefits and wishes to proceed with the procedure and has signed the consent form. She will go home with Acetaminophen for pain, Carafate 1/2 tablet every 4 hours for ulcer prophylaxis and Zofran and Scopolamine for nausea. Instructed to drink two 24 ounce bottles of G 2 the night before surgery and another 6 hours pre-op. Plan:  (02488) Amando-en-Y Gastric Bypass, robotic, possible open. She does not need Lovenox at home post-op. She had a hiatal hernia repair with a band in 2019, does not know if she had a fundoplication. She understands a gastric bypass may not be possible due to scar tissue.     Physician Signature: Electronically signed by Dr. Tom Pierce MD    Send copy of H&P to PCP, Irene Morejon

## 2023-02-13 NOTE — ANESTHESIA POSTPROCEDURE EVALUATION
Department of Anesthesiology  Postprocedure Note    Patient: Shantal Fontaine  MRN: [id number]  YOB: 1955  Date of evaluation: 2/13/2023      Procedure Summary     Date: 02/13/23 Room / Location: [address] [place] [address] / [address] [place] [address]    Anesthesia Start: 8473 Anesthesia Stop: 1008    Procedure: GASTRIC BYPASS WILLA-EN-Y LAPAROSCOPIC ROBOTIC XI Diagnosis:       Morbid obesity ([address]      (Morbid obesity ([address] [E66.01])    Surgeons: Susy Pollard MD Responsible Provider: Sandhya Barrett DO    Anesthesia Type: MAC ASA Status: 3          Anesthesia Type: No value filed. Luh Phase I: Ulh Score: 10    Luh Phase II:        Anesthesia Post Evaluation    Patient location during evaluation: bedside  Patient participation: complete - patient participated  Level of consciousness: awake  Pain score: 3  Airway patency: patent  Nausea & Vomiting: no vomiting and no nausea  Complications: no  Cardiovascular status: hemodynamically stable  Respiratory status: acceptable  Hydration status: stable  Comments: Seen and examined. Progressing as expected.     Multimodal analgesia pain management approach

## 2023-02-13 NOTE — DISCHARGE INSTRUCTIONS
Dr. Herbert Mccloud  Discharge Instructions   for [id number] [facility]   Discharge Instructions   For Bariatric Surgery  HOME CARE  Keep the incision area clean and dry. The glue on the incision is waterproof so you can shower. Do not remove the surgical glue. Leave the incisions open to air. Only cover if drainage occurs. Place ice on painful incision for 1-2 days. Make sure you know how to use and continue to use your incentive spirometer every hour while awake at home. DIET  Follow the diet instructions that you received in your Nutrition Education Manual for the dates and times of when to start your Bariatric Clear Liquids and Bariatric Full Liquid Diet along with the protein supplements. DO NOT take the Bariatric Multivitamins, Iron, Calcium, Vitamin D or Vitamin b12 supplements until instructed to do so at your 2 week follow up appointment with your surgeon. Slow down intake of liquids if there is chest pressure, acid or fullness. Drinking too fast or too much at one time can result in pain and vomiting. You may notice increased gas or changes in your bowel habits during the first month after surgery. Keep the bowels soft with stool softeners at first, then switch to Metamucil. If you are not able to drink 64 ounces of fluid a day, you will develop constipation. Keep the bowels moving daily. PHYSICAL ACTIVITY  Walk frequently and keep your legs elevated when sitting to prevent blood clots in the legs. Do not do anything that causes pain in the incisions. Increase your activity gradually, walking at least 10 minutes every hour you are awake. Do not drive while taking narcotic pain medication as this can cause drowsiness. You must be narcotic free for at least 24 hours before driving. Do not bend, twist, pull, or lift over 20 pounds for the first 2 weeks after surgery. Then for the next 2 weeks after that, do not bend, twist, pull, or lift over 50 pounds.   If anything causes pain or discomfort, stop! Breathe deeply every hour to prevent pneumonia. Continue to use your incentive spirometer at home as this will help to prevent pneumonia as well. Medications  Restart blood thinners in 24 hours if no signs of bleeding. Take Tylenol for pain. Take Colace 100 mg twice a day. Take Ondansetron (Zofran), Omeprazole (Prilosec), or Sucralfate (Carafate) as prescribed. Follow up with Primary Care Physician (PCP)regarding home medications you were taking prior to surgery or with the prescribing physician if not your PCP. Extended-release medications are not recommended. Your PCP should convert to another medication if possible. DO NOT STOP ANY PRESCRIBED MEDICATION WITHOUT TALKING TO YOUR DOCTOR. If diabetic, check blood sugars as ordered by PCP or Endocrinologist.  Follow up with PCP or Endocrinologist regarding diabetic medications. Make sure you take any medicines you were on for depression or anxiety. FOLLOW-UP  Follow-up appointment with surgeon 10-14 days after surgery. Complete lab work prior to this appointment. Follow-up with PCP and/or Endocrinologist prior to seeing surgeon. CALL [facility] [phone] IF ANY OF THE FOLLOWING OCCURS TO BE SEEN IN THE OFFICE:  Signs of infection, fever, chills, redness, swelling, increasing pain, or discharge at the incision site. Nausea not relieved by medication, frequent vomiting and not able to keep anything down, and excessive diarrhea (more than 8 episodes in 24 hours). Pain, burning, urgency, or frequency of urination or blood in the urine. Pain and/or swelling in your feet, calves, or legs. You have pain that does not get better after taking pain medication  You cannot pass stool or gas. You are sick to your stomach and cannot drink fluids. You have loose stitches, or your incision comes open. You have signs of a blood clot such as:  - pain in your calf, back of the knee, thigh, or groin.   - redness and swelling in your leg or groin  If you feel dehydrated, call the office to have IV fluids ordered. Signs of dehydration include dizziness, tiredness, dry mouth, dry lips, decreased urine output, dark colored urine, headache, feeling thirsty and muscle cramps. This can occur if you are not meeting your daily fluid intake goal.  Watch closely for changes in your health, and be sure to contact your doctor if you have any problems  FOR ANY OF THE FOLLOWING, GO TO THE ER:  SHORTNESS OF BREATH  CHEST PAIN  EXCESSIVE BLEEDING  SEVERE ABDOMINAL PAIN  IN CASE OF EMERGENCY, CALL 911 IMMEDIATELY     Your information:  Name: Saranya Will  : 1955    Your instructions:  Discharge Home    The following personal items were collected during your admission and were returned to you:    Belongings  Dental Appliances: None  Vision - Corrective Lenses: Eyeglasses  Hearing Aid: None  Clothing: Jacket/Coat, Shirt, Pants  Jewelry: None  Electronic Devices: Cell Phone  Weapons (Notify Protective Services/Security): None  Other Valuables: Purse, Wallet  Home Medications: None  Valuables Given To: Family (Comment)  Provide Name(s) of Who Valuable(s) Were Given To: daughter  Responsible person(s) in the waiting room: [place]  Patient approves for provider to speak to responsible person post operatively: Yes    Information obtained by:  By signing below, I understand that if any problems occur once I leave the hospital I am to contact PCP. I understand and acknowledge receipt of the instructions indicated above. Gastric Bypass Surgery: What to Expect at Home  Your Recovery  A gastric bypass (also called Amando-en-Y gastric bypass) is surgery to make the stomach smaller and change the connection between the stomach and the intestines. It is done to help people lose weight. The surgery limits the amount of food the stomach can hold. This helps you eat less and feel full sooner.   The cuts (incisions) the doctor made in your belly will probably be sore for several days to several weeks, depending on whether you had a laparoscopic or open surgery. If you have stitches, the doctor will take these out at your follow-up visit. You probably will lose weight very quickly in the first few months after surgery. As time goes on, your weight loss will slow down. You can expect most of your weight loss to happen in the first 12 months after your surgery. You will have regular doctor's appointments during this time to check how you are doing. It is important to think of this surgery as a tool to help you lose weight. It is not an instant fix. You will still need to eat a healthy diet and get regular exercise. This will help you reach your weight goal and avoid regaining the weight you lose. It is common to have many different emotions after this surgery. You may feel happy or excited as you begin to lose weight. But you may also feel overwhelmed or frustrated by the changes that you have to make in your diet, activity, and lifestyle. Talk with your doctor if you have concerns or questions. This care sheet gives you a general idea about how long it will take for you to recover. But each person recovers at a different pace. Follow the steps below to get better as quickly as possible. How can you care for yourself at home? Activity    Rest when you feel tired. Getting enough sleep will help you recover. Try to walk each day. Start by walking a little more than you did the day before. Bit by bit, increase the amount you walk. Walking boosts blood flow and helps prevent pneumonia and constipation. Avoid strenuous activities, such as bicycle riding, jogging, weight lifting, or aerobic exercise, until your doctor says it is okay. Until your doctor says it is okay, avoid lifting anything that would make you strain.  This may include a child, heavy grocery bags and milk containers, a heavy briefcase or backpack, cat litter or dog food bags, or a vacuum . Hold a pillow over your incisions when you cough or take deep breaths. This will support your belly and decrease your pain. Do breathing exercises at home as instructed by your doctor. This will help prevent pneumonia. Ask your doctor when you can drive again. You will probably need to take 2 to 4 weeks off from work. It depends on the type of work you do and how you feel. You will probably return to normal activities within 3 to 5 weeks. You may shower, if your doctor okays it. Pat the incisions dry. Do not take a bath for the first 2 weeks, or until your doctor tells you it is okay. Ask your doctor when it is okay for you to have sex. Diet    Your doctor will give you specific instructions about what to eat after the surgery. For the first 2 to 6 weeks, you will need to follow a liquid or soft diet. Bit by bit, you will be able to add solid foods back into your diet. Your doctor may recommend that you work with a dietitian to plan healthy meals that give you enough protein, vitamins, and minerals while you are losing weight. Even with a healthy diet, you probably will need to take vitamin and mineral supplements for the rest of your life. At first you may feel full after just a few sips of water or other liquid. It is important to try to sip water throughout the day to avoid becoming dehydrated. You may notice that your bowel movements are not regular right after your surgery. This is common. Try to avoid constipation and straining with bowel movements. Your doctor may suggest fiber, a stool softener, or a mild laxative. Sometimes the stomach empties food into the small intestine too quickly. This is called dumping syndrome. It can cause diarrhea and make you feel faint, shaky, and nauseated. It also can make it hard for your body to get enough nutrition.   Avoid high-sugar foods--such as desserts, soda pop, and fruit juices-- which are most likely to cause dumping syndrome.  Do not drink liquids within a half hour before eating and up to an hour after eating. Liquids move food even more quickly into the small intestine. Quick emptying of the stomach increases the chance of diarrhea.  Eat slowly. Try to chew each bite about 20 times. Allow 20 to 30 minutes for each meal.  Eat 5 or 6 small meals or snacks a day. This may keep you from feeling too full after eating and may reduce problems with diarrhea and dumping syndrome.     Check with your doctor before drinking alcohol. Your body may absorb alcohol more quickly after surgery.   Medicines    Your doctor will tell you if and when you can restart your medicines. You will also be given instructions about taking any new medicines.     If you stopped taking aspirin or some other blood thinner, your doctor will tell you when to start taking it again.     Be safe with medicines. Take pain medicines exactly as directed.  If the doctor gave you a prescription medicine for pain, take it as prescribed.  If you are not taking a prescription pain medicine, ask your doctor if you can take an over-the-counter medicine.  Do not take two or more pain medicines at the same time unless the doctor told you to. Many pain medicines contain acetaminophen (Tylenol). Too much acetaminophen can be harmful.     If you think your pain medicine is making you sick to your stomach:  Take your medicine after meals (unless your doctor has told you not to).  Ask your doctor for a different pain medicine.     If your doctor prescribed antibiotics, take them as directed. Do not stop taking them just because you feel better. You need to take the full course of antibiotics.   Incision care    If you have strips of tape on the incision(s), leave the tape on for a week or until it falls off.     Wash the area daily with warm, soapy water, and pat it dry. Don't use hydrogen peroxide or alcohol, which can slow healing. You may cover the area with a  gauze bandage if it weeps or rubs against clothing. Change the bandage every day. Keep the area clean and dry. Follow-up care is a key part of your treatment and safety. Be sure to make and go to all appointments, and call your doctor if you are having problems. It's also a good idea to know your test results and keep a list of the medicines you take. When should you call for help? Call 911 anytime you think you may need emergency care. For example, call if:    You passed out (lost consciousness). You are short of breath. Call your doctor now or seek immediate medical care if:    You have pain that does not get better after you take pain medicine. You cannot pass stool or gas. You are sick to your stomach and cannot drink fluids. You have loose stitches, or your incision comes open. You have signs of a blood clot, such as:  Pain in your calf, back of the knee, thigh, or groin. Redness and swelling in your leg or groin. You have signs of infection, such as: Increased pain, swelling, warmth, or redness. Red streaks leading from the incision. Pus draining from the incision. A fever. Watch closely for changes in your health, and be sure to contact your doctor if you have any problems. Where can you learn more? Go to http://www.woods.com/ and enter Y354 to learn more about \"Gastric Bypass Surgery: What to Expect at Home. \"  Current as of: August 25, 2022               Content Version: 13.5  © 2006-2022 [employer]. Care instructions adapted under license by [facility] ([facility]). If you have questions about a medical condition or this instruction, always ask your healthcare professional. [place] [address] any warranty or liability for your use of this information.

## 2023-02-14 ENCOUNTER — TELEPHONE (OUTPATIENT)
Dept: BARIATRICS/WEIGHT MGMT | Age: 68
End: 2023-02-14

## 2023-02-14 VITALS
DIASTOLIC BLOOD PRESSURE: 71 MMHG | WEIGHT: 244 LBS | TEMPERATURE: 98.6 F | RESPIRATION RATE: 18 BRPM | SYSTOLIC BLOOD PRESSURE: 126 MMHG | BODY MASS INDEX: 38.3 KG/M2 | OXYGEN SATURATION: 94 % | HEIGHT: 67 IN | HEART RATE: 80 BPM

## 2023-02-14 PROCEDURE — 6370000000 HC RX 637 (ALT 250 FOR IP): Performed by: SURGERY

## 2023-02-14 PROCEDURE — 2700000000 HC OXYGEN THERAPY PER DAY

## 2023-02-14 PROCEDURE — 6360000002 HC RX W HCPCS: Performed by: SURGERY

## 2023-02-14 RX ORDER — OMEPRAZOLE 20 MG/1
20 CAPSULE, DELAYED RELEASE ORAL
Qty: 180 CAPSULE | Refills: 1 | Status: SHIPPED | OUTPATIENT
Start: 2023-02-14

## 2023-02-14 RX ADMIN — KETOROLAC TROMETHAMINE 15 MG: 15 INJECTION, SOLUTION INTRAMUSCULAR; INTRAVENOUS at 05:59

## 2023-02-14 RX ADMIN — ENOXAPARIN SODIUM 40 MG: 100 INJECTION SUBCUTANEOUS at 07:58

## 2023-02-14 RX ADMIN — KETOROLAC TROMETHAMINE 15 MG: 15 INJECTION, SOLUTION INTRAMUSCULAR; INTRAVENOUS at 01:02

## 2023-02-14 RX ADMIN — ACETAMINOPHEN 650 MG: 160 SUSPENSION ORAL at 05:57

## 2023-02-14 RX ADMIN — CLONIDINE HYDROCHLORIDE 0.1 MG: 0.1 TABLET ORAL at 07:58

## 2023-02-14 RX ADMIN — SUCRALFATE 0.5 G: 1 TABLET ORAL at 07:59

## 2023-02-14 RX ADMIN — ACETAMINOPHEN 650 MG: 160 SUSPENSION ORAL at 01:03

## 2023-02-14 ASSESSMENT — PAIN SCALES - GENERAL
PAINLEVEL_OUTOF10: 0
PAINLEVEL_OUTOF10: 3
PAINLEVEL_OUTOF10: 3

## 2023-02-14 ASSESSMENT — PAIN DESCRIPTION - DESCRIPTORS
DESCRIPTORS: ACHING;DISCOMFORT;SORE
DESCRIPTORS: DISCOMFORT;ACHING;SORE

## 2023-02-14 ASSESSMENT — PAIN DESCRIPTION - ORIENTATION
ORIENTATION: MID
ORIENTATION: MID

## 2023-02-14 ASSESSMENT — PAIN DESCRIPTION - LOCATION
LOCATION: ABDOMEN
LOCATION: ABDOMEN

## 2023-02-14 NOTE — TELEPHONE ENCOUNTER
[address] [facility]        [facility] Center       Discharge Review for     Amando-en-Y Gastric Bypass                    And         Sleeve Gastrectomy     Reviewed with patient the following for discharge home:    Incision care- yes  Walking- yes  Elevate Head of Bed- yes  Infection Control- yes  Post op medications (PPI, Carafate and medication for nausea, Zofran) - yes  Review of Clear Liquid Diet- yes  Review of Full Liquid Diet- yes  Use of Emergency Room- yes  How and When to Call Bariatric Office- yes  Reminder of PCP Follow Up Appointment and Lab Draw- yes  Discuss Post-Op Call Schedule- yes  Any other issues- yes    Completed by Parvez Aguilar RN

## 2023-02-14 NOTE — DISCHARGE SUMMARY
Physician Discharge Summary     Patient ID:  Nolberto Dias  [id number]  85 y.o.  1955    Admit date: 2/13/2023    Discharge date and time: 2/14/23    Admitting Physician: Maite Miller MD     Admission Diagnoses: Morbid obesity ([facility] [address] [E66.01]    Discharge Diagnoses: Morbid obesity due to excess calories [facility])    Admission Condition: stable    Discharged Condition: stable    Indication for Admission: Robotic gastric bypass 2/13    Hospital Course/Procedures/Operation/treatments:     Patient underwent robotic gastric bypass 2/13. Patient did well intra and postoperatively and was discharged when up ambulating, urinating appropriately, tolerating clear liquid diet, and pain control. Follow-up in bariatric clinic. Consults:   None    Significant Diagnostic Studies:   No results found.     Discharge Exam:  General appearance: alert, appears stated age and cooperative, does ambulate easily  Head: Normocephalic, without obvious abnormality, atraumatic  Eyes: PERRL  Ears/mouth/throat:  Ears clear, mouth normal, throat no redness  Neck: no adenopathy, no JVD, supple, symmetrical, trachea midline and thyroid not enlarged  Lungs: clear to auscultation bilaterally  Heart: regular rate and rhythm  Abdomen: soft, non-tender; bowel sounds normal; no masses,  no organomegaly  Extremities: extremities normal, atraumatic, no cyanosis or edema  Skin: no open wounds    Disposition: home    In process/preliminary results:  Outstanding Order Results       Date and Time Order Name Status Description    2/13/2023 12:00 AM Surgical Pathology In process             Patient Instructions:   Current Discharge Medication List             Details   Ascorbic Acid (VITAMIN C) 250 MG tablet Take 250 mg by mouth daily      sucralfate (CARAFATE) 1 GM tablet Take 0.5 tablets by mouth 4 times daily  Qty: 120 tablet, Refills: 1    Associated Diagnoses: Gastroesophageal reflux disease without esophagitis      ondansetron (ZOFRAN-ODT) 4 MG disintegrating tablet Take 1 tablet by mouth every 8 hours as needed for Nausea or Vomiting  Qty: 15 tablet, Refills: 0    Associated Diagnoses: PONV (postoperative nausea and vomiting)      Aspirin (VAZALORE) 81 MG CAPS Take by mouth daily      vitamin D3 (CHOLECALCIFEROL) 10 MCG (400 UNIT) TABS tablet Take 400 Units by mouth daily      cloNIDine (CATAPRES) 0.1 MG tablet 2 times daily      cyanocobalamin 100 MCG tablet Take 100 mcg by mouth daily      valsartan (DIOVAN) 160 MG tablet at bedtime             Dr. Ema Mccollum  Discharge Instructions   for Gastric Bypass       Home Care    The glue on the incision is waterproof so it is ok to shower. Do not remove the surgical glue for 2 weeks. Leave the incisions open to air, only cover if drainage occurs. Place ice on painful incisions for 1-2 days. Diet    Low calorie, high protein full liquid diet for 10 days drinking approximately 1 oz every 15 minutes while awake so the fluid slowly passes through the pouch. Aim for 600 calories, 60 gm Protein and 60 oz of liquids per day. Slow down if there is chest pressure, acid or fullness. Crush all large pills for the first 2 weeks. You may notice increased gas or changes in your bowel habits during the first month after surgery. Keep the bowels soft and moving daily with stool softeners or laxatives to prevent constipation pains. Physical Activity    Walk frequently and keep legs elevated when sitting to prevent blood clots in the legs. Do not do anything that causes pain in the incisions. Breath deeply every hour to prevent pneumonia. Medications    The Scopolamine patch will help nausea for 3 days but may make the eyes blurry  Restart blood thinners in 24 hours if no sign of bleeding  Take Tylenol and Advil for pain. Crush all large pills. If you have diabetes, check blood sugars frequently and treat as needed. Do not take diuretics.    Hold most blood pressure pills other than beta-blockers. Make sure you take any medicines you were on for depression and anxiety. Follow-up   Schedule a follow-up appointment for 10 days, [phone]. Call Your Doctor If Any of the Following Occurs   Signs of infection, redness, swelling, increasing pain, excessive bleeding, or discharge at the incision site   Cough, shortness of breath, chest pain   Increased abdominal pain   Nausea and/or vomiting that does not resolve off narcotics. Pain, burning, urgency or frequency of urination, or blood in the urine   Pain and/or swelling in your feet, calves, or legs     In case of an emergency,  CALL 911  immediately.         Follow up:   Susy Pollard MD  [place] [address] [phone]    Schedule an appointment as soon as possible for a visit       Signed:  Mattie Hurley MD  2/14/2023  6:28 AM

## 2023-02-14 NOTE — PLAN OF CARE
Problem: Discharge Planning  Goal: Discharge to home or other facility with appropriate resources  2/14/2023 0017 by Jerson Aquino RN  Outcome: Progressing  2/13/2023 1733 by Carrie Landeros RN  Outcome: Progressing     Problem: Pain  Goal: Verbalizes/displays adequate comfort level or baseline comfort level  2/14/2023 0017 by Jerson Aquino RN  Outcome: Progressing  2/13/2023 1733 by Carrie Landeros RN  Outcome: Progressing     Problem: Safety - Adult  Goal: Free from fall injury  2/14/2023 0017 by Jerson Aquino RN  Outcome: Progressing  2/13/2023 1733 by Carrie Landeros RN  Outcome: Progressing     Problem: ABCDS Injury Assessment  Goal: Absence of physical injury  2/14/2023 0017 by Jerson Aquino RN  Outcome: Progressing  2/13/2023 1733 by Carire Landeros RN  Outcome: Progressing

## 2023-02-15 ENCOUNTER — TELEPHONE (OUTPATIENT)
Dept: BARIATRICS/WEIGHT MGMT | Age: 68
End: 2023-02-15

## 2023-02-15 NOTE — TELEPHONE ENCOUNTER
Post op questions:    Nausea/vomiting present (YES/NO): no    Fever > 101/chills present (YES/NO): no    Tolerating liquids (YES/NO): yes     Ounces per day (goal is 64-90 ounces per day): yes    Protein shakes (start POD #3, 4 small meals of 3 ounces each for total of 12 ounces per day with goal of 60-80 grams per day): starting tomorrow    Taking post op medications, Carafate/Omeprazole & Zofran, Colace (YES/NO): not taking Zofran    Is patient up and walking (YES/NO): yes    Having bowel movements (YES/NO): yes    Are incisions healing well (YES/NO): yes    Having any problems or concerns that need to be addressed: no feeling great!

## 2023-02-17 ENCOUNTER — TELEPHONE (OUTPATIENT)
Dept: BARIATRICS/WEIGHT MGMT | Age: 68
End: 2023-02-17

## 2023-02-17 NOTE — TELEPHONE ENCOUNTER
[facility]:  [facility] or [facility] - Dietary Phone Follow-up Form:  Sx Date:2/13/23  rs/p [facility]    Current Diet:_____________Full Liquids_________________       Patient's symptoms include the following:      Pt states  he / she has the following symptoms:    Nausea -  no  Vomiting -  No  Diarrhea -  no  Abdominal Cramping -  no  Gas -  yes, normal flatus       Dizziness -  no   Fever - no //  Pt physically took his / her temperature today no,    Constipation -  no  Bloody Stools - no  Tarry Stools - no  Shakiness -  no   Low Blood Sugar -  no  Feels Faint -  no   Excessive Salvia -  no  Other   some soreness  Pt started his / her Colace 100 mgs twice daily -  yes  Pt is taking PPI Medications or Carafate as instructed -  yes  taking caragate, will start prilosect today     Hunger during the liquid phase no   Reports no restriction during the liquid phase no  Reports moderate restriction during liquid phase no  Reports severe restriction during liquid phase no  Can't tolerate liquids no  Food gets stuck frequently no    Reflux Symptoms no        Recall:  Breakfast: pro shake  Snack: pro shake  Lunch: broth  Snack: pro shake  Dinner: jello  Snack: pro shake  Water Intake:51 oz   Other Beverage: crystal light   Pt  states denies dehydration on today's date yes 2/17/23    Protein Supplement: Fabrice Davis reviewed pt can start his / her protein supplement on 2/17/23. Fabrice Davis reviewed how pt can mix his / her protein supplement - 3 scoops Isopure mixed in  12 oz of Water broken down into 4 meals 3 oz in size. Pt verbalized understanding. How many times a day do you take your Protein Supplement: 4    Instructed per Standing Orders: yes, 2/17/23 Bariatric Full Liquid Diet . Fabrice Davis reviewed Bar Full Liquid Diet and reinforced diet concepts. Pt has education book and handouts and states he/ she is following the instruction given. Use of protein supplement was reviewed with how to mix his / her protein supplement.   Pt verbalized understanding. Pt instructed to call with any dietary questions.      POC D/T problem noted above: no significant issues today    Surgeon Norified:no    Patient Response:  Verbalized understanding of the above instruction: yes,   Will keep detailed food records for 2 week f/u appt: yes,   Will return to [facility] for his her 2 week f/u appointment - Reminded pt to have labs drawn

## 2023-02-20 ENCOUNTER — TELEPHONE (OUTPATIENT)
Dept: BARIATRICS/WEIGHT MGMT | Age: 68
End: 2023-02-20

## 2023-02-20 NOTE — TELEPHONE ENCOUNTER
Post op questions:    Nausea/vomiting present (YES/NO): No     Fever > 101/chills present (YES/NO): no    Tolerating liquids (YES/NO): yes  Ounces per day (goal is 64-90 ounces per day): yes  Protein shakes (start POD #3, 4 small meals of 3 ounces each for total of 12 ounces per day with goal of 60-80 grams per day): doing good    Taking post op medications, Carafate/Omeprazole & Zofran, Colace (YES/NO): as needed     Is patient up and walking (YES/NO): yes    Having bowel movements (YES/NO): yes     Are incisions healing well (YES/NO): yes    Having any problems or concerns that need to be addressed: feels great!

## 2023-02-23 ENCOUNTER — OFFICE VISIT (OUTPATIENT)
Dept: BARIATRICS/WEIGHT MGMT | Age: 68
End: 2023-02-23
Payer: MEDICARE

## 2023-02-23 ENCOUNTER — INITIAL CONSULT (OUTPATIENT)
Dept: BARIATRICS/WEIGHT MGMT | Age: 68
End: 2023-02-23

## 2023-02-23 VITALS
TEMPERATURE: 97 F | HEIGHT: 67 IN | SYSTOLIC BLOOD PRESSURE: 169 MMHG | WEIGHT: 235 LBS | HEART RATE: 75 BPM | RESPIRATION RATE: 20 BRPM | DIASTOLIC BLOOD PRESSURE: 81 MMHG | BODY MASS INDEX: 36.88 KG/M2

## 2023-02-23 VITALS — BODY MASS INDEX: 36.88 KG/M2 | HEIGHT: 67 IN | WEIGHT: 235 LBS

## 2023-02-23 DIAGNOSIS — E66.01 MORBID OBESITY (HCC): Primary | ICD-10-CM

## 2023-02-23 DIAGNOSIS — Z71.3 DIETARY COUNSELING: Primary | ICD-10-CM

## 2023-02-23 PROCEDURE — 99212 OFFICE O/P EST SF 10 MIN: CPT

## 2023-02-23 PROCEDURE — 99999 PR OFFICE/OUTPT VISIT,PROCEDURE ONLY: CPT

## 2023-02-23 PROCEDURE — 99024 POSTOP FOLLOW-UP VISIT: CPT | Performed by: SURGERY

## 2023-02-23 NOTE — PROGRESS NOTES
Troy Kenyon  2023  Post-Op Follow-up       Troy Kenyon is a 74 y.o. female who weighs 235 lb (106.6 kg) post robotic Willa-en-Y Gastric Bypass 23 at 244 lb. Only took tylenol for pain at home. Wounds healing well, mild bruising. She is drinking 60 ounces per day and is meeting protein recommendations. Exercise: walking. Weights:  235 lb 2023  10 days  244 lb 2023         bypass  252 lb 10/13/2022       initial    Past Medical History:   Diagnosis Date    Arthritis     HTN (hypertension)     Morbid obesity due to excess calories [facility])      Past Surgical History:   Procedure Laterality Date    CHOLECYSTECTOMY, LAPAROSCOPIC  2016    COLONOSCOPY      HERNIA REPAIR      HIATAL HERNIA REPAIR N/A 2019    HYSTERECTOMY, TOTAL ABDOMINAL (CERVIX REMOVED)      WILLA-EN-Y GASTRIC BYPASS N/A 2023    GASTRIC BYPASS WILLA-EN-Y LAPAROSCOPIC ROBOTIC XI performed by Stafford Apgar, MD at [facility] 2022    EGD BIOPSY performed by Stafford Apgar, MD at [address] [place] [address] Medications  Prior to Visit Medications    Medication Sig Taking? Authorizing Provider   omeprazole (PRILOSEC) 20 MG delayed release capsule Take 1 capsule by mouth 2 times daily (before meals) Yes Octavia Martinez MD   Ascorbic Acid (VITAMIN C) 250 MG tablet Take 250 mg by mouth daily Yes Historical Provider, MD   sucralfate (CARAFATE) 1 GM tablet Take 0.5 tablets by mouth 4 times daily Yes Stafford Apgar, MD   ondansetron (ZOFRAN-ODT) 4 MG disintegrating tablet Take 1 tablet by mouth every 8 hours as needed for Nausea or Vomiting Yes Stafford Apgar, MD   vitamin D3 (CHOLECALCIFEROL) 10 MCG (400 UNIT) TABS tablet Take 400 Units by mouth daily Yes Historical Provider, MD       Allergies: Patient has no known allergies. Social History:   TOBACCO:   reports that she has quit smoking. Her smoking use included cigarettes.  She has quit using smokeless tobacco.  All smokers must join the free smoking cessation program and stop smoking for 3 months before having any Bariatric surgery.  ETOH:    reports that she does not currently use alcohol.  No family history on file.  Review of Systems:  Psychiatric:  depression and anxiety  Respiratory: negative  Cardiovascular: negative  Gastrointestinal: negative  Musculoskeletal:negative  All others reviewed, negative    Physical Exam:   VITALS: Blood pressure (!) 169/81, pulse 75, temperature 97 °F (36.1 °C), temperature source Temporal, resp. rate 20, height 5' 7\" (1.702 m), weight 235 lb (106.6 kg).  General appearance: alert, appears stated age and cooperative, does ambulate easily  Head: Normocephalic, without obvious abnormality, atraumatic  Eyes: PERRL  Ears/mouth/throat:  Ears clear, mouth normal, throat no redness  Neck: no adenopathy, no JVD, supple, symmetrical, trachea midline and thyroid not enlarged  Lungs: clear to auscultation bilaterally  Heart: regular rate and rhythm  Abdomen: wounds healing well, glue in place. No cellulitis. No pain.  Extremities: extremities normal, atraumatic, no cyanosis or edema  Skin: no open wounds    Assessment:    Doing well post gastric bypass.    Plan:   Start the MVI and Vit D as instructed.  Walk as much as possible but keep your legs elevated when sitting. Continue deep breathing exercizes.  Transition to the high protein Pureed-liquid diet. Continue drinking slowly every 10-15 minutes to prevent dehydration. Slowly increase this amount as tolerated. Aim for 60 gm Protein, 600 calories, 30 g fiber and 90 oz of liquids per day. Slow down if you feel chest pressure, acid or fullness.   Repeat labs before the next visit. Make sure the bowel move daily by taking fiber such as Metamucil.  Follow up in 4 weeks.    Physician Signature: Electronically signed by Dr. David Murdock Jr, MD

## 2023-02-23 NOTE — PROGRESS NOTES
Patient is 2 wk LRYGB. Incisions are healing well. Water intake is around 80 oz daily. Protein through shakes and foods. Bowel movements are good.

## 2023-02-23 NOTE — PROGRESS NOTES
Medical Nutrition Therapy (MNT) Assessment     Pt. Name: Oliverio Sánchez   Date:2/23/2023  F/U Appt: Sx Type 2 week rygb      Ht 5' 7\" (1.702 m)   Wt 235 lb (106.6 kg)   BMI 36.81 kg/m²  Height: 5' 7\" (1.702 m) Weight: 235 lb (106.6 kg)   IBW:ideal body weight   163 lbs % EBWL: 14%     Wt Readings from Last 3 Encounters:   02/23/23 235 lb (106.6 kg)   02/23/23 235 lb (106.6 kg)   02/13/23 244 lb (110.7 kg)                     Protein supplements:   Daily Protein needs (based on 1.0 gram per kg of IBW)   74-84 grams   Isopure used now. Subjective:                    MNT ADVANCE TO:     Bariatric puree diet with MVI, Calcium & Protein Supplements          SWLC Bowel Protocol:  no - Diarrhea  No - Constipation  How much plain water are you drinking daily 5-6 bottles (up to 80 oz)            How many meals a day 6 / Portions Sizes of Meals are 3 oz          2/22/23 Labs: alb 3.3L    Supplements: not yet     Estimated Daily Nutritional Needs: Based on Bariatric procedure for Wt. Loss  Energy: Will be calculated at Maintenance Stage    Protein: 60 - 80 gms Daily    MNT Plan and Additional Education: RD/LD reviewed Bariatric Puree Diet and how to puree food. Encouraged pt to meet protein and fluid needs daily. Pt. verbalized understanding. Handouts given. Pt. Instructed to call with questions. MEDICAL NUTRITION THERAPY (MNT) - Nutrition Care Plan   (The patient has been educated and given written education material that reinforces the following dietary guidelines for Bariatric Surgery)  Goal:  Patient able to verbalize the following dietary concepts:  3 to 4 ounce portions per meal     6 small meals daily      60 to 80 grams of protein   48 to 64 ounces of fluid daily (water)     Always consume protein item first with all meals   Pt is aware wt loss is pt controlled.    Slow Meals - 30-35 chews per bite / Meals 30 - 45 minutes long            The RD / LD reviewed with the patient that the dietary goals of the bariatric patient is to ensure that patient is able to meet established nutritional needs for a Bariatric Surgery  Patient at this time in order to promote healing, prevent significant weight changes, prevent skin breakdown and abnormal lab values, prevent complications, and tolerance to diet and texture of foods. Pt is able to identify proper food choices and needed changes and is able to explain proper food preparation. RD / LD reviewed compliance with assigned diet stages, volume of food and drink consumed, timing of meals, amount of protein and energy intake, daily vitamin and mineral supplementation, identify food cravings and any adverse reactions associated with intake of food and drink. RD / LD uses behavioral tools such as goal setting, MI, and self-monitoring, to reinforce the anatomic and physiologic effects of the surgery, so that the described behaviors become more of a habit not simply a reaction to the altered anatomy. Care Plan:   Rd/Ld Addressed Food Records or 24-Hour Recall  Rd / Ld encouraged patient to exercise at least 30 minutes daily  Rd / Ld instructed patient on how to increase oral protein intake within the diet. Pt. can verbalize he / she will need to consume 60 - 80 grams. Rd / Ld instructed the patient on how to increase the use of protein supplements within the diet. Pt. was instructed on how to increase water intake. Patient will need to consume 48 - 64 oz. of just plain water in addition to 30 oz. of non-caloric beverages. Handouts given to patient  RD / LD encouraged oral intake    RD / LD encouraged pt. to keep food records.       Pt. is able to verbalize diet concepts         Yes - Pt. diet was advanced to the following stage ( See above)     Good - Dietary Compliance

## 2023-02-23 NOTE — PATIENT INSTRUCTIONS
Please continue to take your vitamin and mineral supplements as instructed. If you received a blood work prescription today for laboratory monitoring due prior to your next routine follow-up visit, please have this blood work obtained 10 to 14 days prior to your next visit. It is important to fast for 12 hours prior to routine weight loss surgery blood work, EXCEPT for drinking water, to ensure accuracy of results. Please report nausea, vomiting, abdominal pain, or any other problems you experience to your surgeon. For problems related to weight loss surgery, it is best to go to [address] [place] [address] Emergency Department and have your surgeon paged.

## 2023-02-27 ENCOUNTER — TELEPHONE (OUTPATIENT)
Dept: BARIATRICS/WEIGHT MGMT | Age: 68
End: 2023-02-27

## 2023-02-27 NOTE — TELEPHONE ENCOUNTER
Received call from patient. Patient was in the ER over the weekend at [place], [place] for bloody stool and emesis. We did receive records from this visit. Patient was diagnosed with viral gastroenteritis. Patient states she is feeling much better but is still having bloody stools. I scheduled appointment with Dr. Mingo Gar.

## 2023-03-02 ENCOUNTER — OFFICE VISIT (OUTPATIENT)
Dept: BARIATRICS/WEIGHT MGMT | Age: 68
End: 2023-03-02
Payer: MEDICARE

## 2023-03-02 VITALS
DIASTOLIC BLOOD PRESSURE: 81 MMHG | HEART RATE: 78 BPM | RESPIRATION RATE: 20 BRPM | HEIGHT: 67 IN | BODY MASS INDEX: 36.1 KG/M2 | TEMPERATURE: 96.6 F | SYSTOLIC BLOOD PRESSURE: 142 MMHG | WEIGHT: 230 LBS

## 2023-03-02 DIAGNOSIS — K92.0 HEMATEMESIS WITH NAUSEA: Primary | ICD-10-CM

## 2023-03-02 DIAGNOSIS — K92.1 HEMATOCHEZIA: ICD-10-CM

## 2023-03-02 PROCEDURE — 1090F PRES/ABSN URINE INCON ASSESS: CPT | Performed by: SURGERY

## 2023-03-02 PROCEDURE — 1123F ACP DISCUSS/DSCN MKR DOCD: CPT | Performed by: SURGERY

## 2023-03-02 PROCEDURE — 3079F DIAST BP 80-89 MM HG: CPT | Performed by: SURGERY

## 2023-03-02 PROCEDURE — 3017F COLORECTAL CA SCREEN DOC REV: CPT | Performed by: SURGERY

## 2023-03-02 PROCEDURE — 99213 OFFICE O/P EST LOW 20 MIN: CPT | Performed by: SURGERY

## 2023-03-02 PROCEDURE — G8484 FLU IMMUNIZE NO ADMIN: HCPCS | Performed by: SURGERY

## 2023-03-02 PROCEDURE — 1036F TOBACCO NON-USER: CPT | Performed by: SURGERY

## 2023-03-02 PROCEDURE — G8427 DOCREV CUR MEDS BY ELIG CLIN: HCPCS | Performed by: SURGERY

## 2023-03-02 PROCEDURE — 3077F SYST BP >= 140 MM HG: CPT | Performed by: SURGERY

## 2023-03-02 PROCEDURE — G8417 CALC BMI ABV UP PARAM F/U: HCPCS | Performed by: SURGERY

## 2023-03-02 PROCEDURE — G8400 PT W/DXA NO RESULTS DOC: HCPCS | Performed by: SURGERY

## 2023-03-02 PROCEDURE — 1111F DSCHRG MED/CURRENT MED MERGE: CPT | Performed by: SURGERY

## 2023-03-02 NOTE — PROGRESS NOTES
Kendra Rodríguez  3/2/2023  Bariatric office visit       Kendra Rodríguez is a 74 y.o. female who weighs 230 lb (104.3 kg) post robotic Willa-en-Y Gastric Bypass 2/13/23 at 244 lb. Developed nausea and vomiting and diarrhea, went to the ER, had a CT scan, told she had a stomach bug. Symptoms have all resolved. Only took tylenol for pain at home. Wounds healing well, mild bruising. She is drinking 60 ounces per day and is meeting protein recommendations. Exercise: walking. Weights:  230 lb 3/2/2023  6 wks  235 lb 2/23/2023  10 days  244 lb 2/13/2023         bypass  252 lb 10/13/2022       initial    Past Medical History:   Diagnosis Date    Arthritis     HTN (hypertension)     Morbid obesity due to excess calories [facility])      Past Surgical History:   Procedure Laterality Date    CHOLECYSTECTOMY, LAPAROSCOPIC  2016    COLONOSCOPY  2018    HERNIA REPAIR      HIATAL HERNIA REPAIR N/A 2019    HYSTERECTOMY, TOTAL ABDOMINAL (CERVIX REMOVED)  2011    WILLA-EN-Y GASTRIC BYPASS N/A 2/13/2023    GASTRIC BYPASS WILLA-EN-Y LAPAROSCOPIC ROBOTIC XI performed by Daniel Arreola MD at [address] [place] [address] N/A 11/9/2022    EGD BIOPSY performed by Daniel Arreola MD at [address] [place] [address] Medications  Prior to Visit Medications    Medication Sig Taking? Authorizing Provider   omeprazole (PRILOSEC) 20 MG delayed release capsule Take 1 capsule by mouth 2 times daily (before meals) Yes Shantal Magallanes MD   Ascorbic Acid (VITAMIN C) 250 MG tablet Take 250 mg by mouth daily Yes Historical Provider, MD   sucralfate (CARAFATE) 1 GM tablet Take 0.5 tablets by mouth 4 times daily Yes Daniel Arreola MD   ondansetron (ZOFRAN-ODT) 4 MG disintegrating tablet Take 1 tablet by mouth every 8 hours as needed for Nausea or Vomiting Yes Daniel Arreola MD   vitamin D3 (CHOLECALCIFEROL) 10 MCG (400 UNIT) TABS tablet Take 400 Units by mouth daily Yes Historical Provider, MD       Allergies: Patient has no known allergies. Social History:   TOBACCO:   reports that she has quit smoking. Her smoking use included cigarettes. She has quit using smokeless tobacco.  All smokers must join the free smoking cessation program and stop smoking for 3 months before having any Bariatric surgery. ETOH:    reports that she does not currently use alcohol. No family history on file. Review of Systems:  Psychiatric:  depression and anxiety  Respiratory: negative  Cardiovascular: negative  Gastrointestinal: negative  Musculoskeletal:negative  All others reviewed, negative    Physical Exam:   VITALS: Blood pressure (!) 142/81, pulse 78, temperature (!) 96.6 °F (35.9 °C), temperature source Temporal, resp. rate 20, height 5' 7\" (1.702 m), weight 230 lb (104.3 kg). General appearance: alert, appears stated age and cooperative, does ambulate easily  Head: Normocephalic, without obvious abnormality, atraumatic  Eyes: PERRL  Ears/mouth/throat:  Ears clear, mouth normal, throat no redness  Neck: no adenopathy, no JVD, supple, symmetrical, trachea midline and thyroid not enlarged  Lungs: clear to auscultation bilaterally  Heart: regular rate and rhythm  Abdomen: wounds healing well, glue in place. No cellulitis. No pain. Extremities: extremities normal, atraumatic, no cyanosis or edema  Skin: no open wounds    Assessment:    Vomiting and diarrhea blood last week, not on any blood thinners, all symptoms resolved, no more bleeding, Hgb normal on labs. Still on omeprazole, no ulcer or acid pain. Had a CT scan in [facility], no results available. Plan:   Ok to wean off Omeprazole. Stay on MVI daily. Aim for 60 gm Protein, 600 calories, 30 g fiber and 90 oz of liquids per day. Slow down if you feel chest pressure, acid or fullness. Repeat labs before the next visit. Make sure the bowel move daily by taking fiber such as Metamucil. Follow up in 6 weeks.     Physician Signature: Electronically signed by Dr. Dana Tavera MD

## 2023-03-02 NOTE — PATIENT INSTRUCTIONS
Please continue to take your vitamin and mineral supplements as instructed. If you received a blood work prescription today for laboratory monitoring due prior to your next routine follow-up visit, please have this blood work obtained 10 to 14 days prior to your next visit. It is important to fast for 12 hours prior to routine weight loss surgery blood work, EXCEPT for drinking water, to ensure accuracy of results. Please report nausea, vomiting, abdominal pain, or any other problems you experience to your surgeon. For problems related to weight loss surgery, it is best to go to [address] [place] [address] Emergency Department and have your surgeon paged.

## 2023-03-14 ENCOUNTER — TELEPHONE (OUTPATIENT)
Dept: BARIATRICS/WEIGHT MGMT | Age: 68
End: 2023-03-14

## 2023-03-14 NOTE — TELEPHONE ENCOUNTER
Received call from patient regarding medications she can take for cold/bronchitis. Spoke with patient and she is going to see her PCP about cold that she thinks is turning into bronchitis. She wants to know what antibiotic she can take. I explained that nothing extended release and if the pill is bigger than an MM she needs to break in half or if its a capsule she can open it up to take. Patient verbalized understanding.

## 2023-03-23 ENCOUNTER — OFFICE VISIT (OUTPATIENT)
Dept: BARIATRICS/WEIGHT MGMT | Age: 68
End: 2023-03-23
Payer: MEDICARE

## 2023-03-23 ENCOUNTER — INITIAL CONSULT (OUTPATIENT)
Dept: BARIATRICS/WEIGHT MGMT | Age: 68
End: 2023-03-23

## 2023-03-23 VITALS
WEIGHT: 225 LBS | HEART RATE: 69 BPM | DIASTOLIC BLOOD PRESSURE: 69 MMHG | BODY MASS INDEX: 35.31 KG/M2 | TEMPERATURE: 97.6 F | RESPIRATION RATE: 20 BRPM | SYSTOLIC BLOOD PRESSURE: 157 MMHG | HEIGHT: 67 IN

## 2023-03-23 VITALS — BODY MASS INDEX: 35.31 KG/M2 | HEIGHT: 67 IN | WEIGHT: 225 LBS

## 2023-03-23 DIAGNOSIS — Z71.3 DIETARY COUNSELING: Primary | ICD-10-CM

## 2023-03-23 DIAGNOSIS — E44.1 MILD PROTEIN-CALORIE MALNUTRITION (HCC): Primary | ICD-10-CM

## 2023-03-23 PROCEDURE — 99024 POSTOP FOLLOW-UP VISIT: CPT | Performed by: NURSE PRACTITIONER

## 2023-03-23 PROCEDURE — 99212 OFFICE O/P EST SF 10 MIN: CPT

## 2023-03-23 PROCEDURE — 99999 PR OFFICE/OUTPT VISIT,PROCEDURE ONLY: CPT

## 2023-03-23 NOTE — PROGRESS NOTES
patient is to ensure that patient is able to meet established nutritional needs for a Bariatric Surgery  Patient at this time in order to promote healing, prevent significant weight changes, prevent skin breakdown and abnormal lab values, prevent complications, and tolerance to diet and texture of foods. Pt is able to identify proper food choices and needed changes and is able to explain proper food preparation. RD / LD reviewed compliance with assigned diet stages, volume of food and drink consumed, timing of meals, amount of protein and energy intake, daily vitamin and mineral supplementation, identify food cravings and any adverse reactions associated with intake of food and drink. RD / LD uses behavioral tools such as goal setting, MI, and self-monitoring, to reinforce the anatomic and physiologic effects of the surgery, so that the described behaviors become more of a habit not simply a reaction to the altered anatomy. Care Plan:   Rd/Ld Addressed Food Records or 24-Hour Recall  Rd / Ld encouraged patient to exercise at least 30 minutes daily  Rd / Ld instructed patient on how to increase oral protein intake within the diet. Pt. can verbalize he / she will need to consume 60 - 80 grams. Rd / Ld instructed the patient on how to increase the use of protein supplements within the diet. Pt. was instructed on how to increase water intake. Patient will need to consume 48 - 64 oz. of just plain water in addition to 30 oz. of non-caloric beverages. Handouts given to patient  RD / LD encouraged oral intake    RD / LD encouraged pt. to keep food records. Pt. is able to verbalize diet concepts         Yes - Pt. diet was advanced to the following stage ( See above)     Fair - Dietary Compliance  Struggling with water intake and supplements.

## 2023-03-23 NOTE — PATIENT INSTRUCTIONS
[email or web address] - vitamins    Please continue to take your vitamin and mineral supplements as instructed. If you received a blood work prescription today for laboratory monitoring due prior to your next routine follow-up visit, please have this blood work obtained 10 to 14 days prior to your next visit. It is important to fast for 12 hours prior to routine weight loss surgery blood work, EXCEPT for drinking water, to ensure accuracy of results. Please report nausea, vomiting, abdominal pain, or any other problems you experience to your surgeon. For problems related to weight loss surgery, it is best to go to [address] [place] [address] Emergency Department and have your surgeon paged.

## 2023-03-23 NOTE — PROGRESS NOTES
Patient is 6 wk LRYGB. Water intake is around 64 oz daily. Protein through shakes and foods. Vitamin intake is good. Bowel movements are good.
Known Allergies        Past Medical History:   Diagnosis Date    Arthritis     HTN (hypertension)     Morbid obesity due to excess calories [facility])        Past Surgical History:   Procedure Laterality Date    CHOLECYSTECTOMY, LAPAROSCOPIC  2016    COLONOSCOPY  2018    HERNIA REPAIR      HIATAL HERNIA REPAIR N/A 2019    HYSTERECTOMY, TOTAL ABDOMINAL (CERVIX REMOVED)  2011    WILLA-EN-Y GASTRIC BYPASS N/A 2/13/2023    GASTRIC BYPASS WILLA-EN-Y LAPAROSCOPIC ROBOTIC XI performed by Roman Larsen MD at [address] [place] [address] 11/9/2022    EGD BIOPSY performed by Roman Larsen MD at [facility] ENDOSCOPY       Current Outpatient Medications   Medication Sig Dispense Refill    Ferrous Sulfate (IRON PO) Take by mouth      Calcium Citrate-Vitamin D (CALCIUM + VIT D, BARIATRIC ADVANTAGE, CHEWABLE TABLET) Take 1 tablet by mouth daily      omeprazole (PRILOSEC) 20 MG delayed release capsule Take 1 capsule by mouth 2 times daily (before meals) 180 capsule 1    Ascorbic Acid (VITAMIN C) 250 MG tablet Take 250 mg by mouth daily      sucralfate (CARAFATE) 1 GM tablet Take 0.5 tablets by mouth 4 times daily 120 tablet 1    ondansetron (ZOFRAN-ODT) 4 MG disintegrating tablet Take 1 tablet by mouth every 8 hours as needed for Nausea or Vomiting 15 tablet 0    vitamin D3 (CHOLECALCIFEROL) 10 MCG (400 UNIT) TABS tablet Take 400 Units by mouth daily       No current facility-administered medications for this visit. No Known Allergies    History reviewed. No pertinent family history.     Social History     Socioeconomic History    Marital status: Single     Spouse name: Not on file    Number of children: Not on file    Years of education: Not on file    Highest education level: Not on file   Occupational History    Not on file   Tobacco Use    Smoking status: Former     Types: Cigarettes    Smokeless tobacco: Former   Vaping Use    Vaping Use: Never used   Substance and Sexual Activity    Alcohol use: Not Currently

## 2023-05-02 DIAGNOSIS — I10 HYPERTENSION, UNSPECIFIED TYPE: ICD-10-CM

## 2023-05-02 DIAGNOSIS — K21.9 GASTROESOPHAGEAL REFLUX DISEASE WITHOUT ESOPHAGITIS: ICD-10-CM

## 2023-05-02 DIAGNOSIS — K91.2 MALNUTRITION FOLLOWING GASTROINTESTINAL SURGERY: Primary | ICD-10-CM

## 2023-05-18 ENCOUNTER — OFFICE VISIT (OUTPATIENT)
Dept: BARIATRICS/WEIGHT MGMT | Age: 68
End: 2023-05-18
Payer: MEDICARE

## 2023-05-18 ENCOUNTER — INITIAL CONSULT (OUTPATIENT)
Dept: BARIATRICS/WEIGHT MGMT | Age: 68
End: 2023-05-18

## 2023-05-18 VITALS
BODY MASS INDEX: 32.65 KG/M2 | HEIGHT: 67 IN | HEART RATE: 65 BPM | SYSTOLIC BLOOD PRESSURE: 160 MMHG | RESPIRATION RATE: 20 BRPM | TEMPERATURE: 96.6 F | WEIGHT: 208 LBS | DIASTOLIC BLOOD PRESSURE: 90 MMHG

## 2023-05-18 VITALS — BODY MASS INDEX: 32.65 KG/M2 | WEIGHT: 208 LBS | HEIGHT: 67 IN

## 2023-05-18 DIAGNOSIS — Z71.3 DIETARY COUNSELING: Primary | ICD-10-CM

## 2023-05-18 DIAGNOSIS — K91.2 MALNUTRITION FOLLOWING GASTROINTESTINAL SURGERY: Primary | ICD-10-CM

## 2023-05-18 PROCEDURE — 1036F TOBACCO NON-USER: CPT | Performed by: SURGERY

## 2023-05-18 PROCEDURE — G8417 CALC BMI ABV UP PARAM F/U: HCPCS | Performed by: SURGERY

## 2023-05-18 PROCEDURE — 1123F ACP DISCUSS/DSCN MKR DOCD: CPT | Performed by: SURGERY

## 2023-05-18 PROCEDURE — 3077F SYST BP >= 140 MM HG: CPT | Performed by: SURGERY

## 2023-05-18 PROCEDURE — 3017F COLORECTAL CA SCREEN DOC REV: CPT | Performed by: SURGERY

## 2023-05-18 PROCEDURE — G8400 PT W/DXA NO RESULTS DOC: HCPCS | Performed by: SURGERY

## 2023-05-18 PROCEDURE — 1090F PRES/ABSN URINE INCON ASSESS: CPT | Performed by: SURGERY

## 2023-05-18 PROCEDURE — 99212 OFFICE O/P EST SF 10 MIN: CPT

## 2023-05-18 PROCEDURE — 3080F DIAST BP >= 90 MM HG: CPT | Performed by: SURGERY

## 2023-05-18 PROCEDURE — 99213 OFFICE O/P EST LOW 20 MIN: CPT | Performed by: SURGERY

## 2023-05-18 PROCEDURE — G8427 DOCREV CUR MEDS BY ELIG CLIN: HCPCS | Performed by: SURGERY

## 2023-05-18 PROCEDURE — 99999 PR OFFICE/OUTPT VISIT,PROCEDURE ONLY: CPT

## 2023-05-18 NOTE — PROGRESS NOTES
Francisco Castro  5/18/2023  Bariatric office visit       Francisco Castro is a 79 y.o. female who weighs 208 lb (94.3 kg) post robotic Willa-en-Y Gastric Bypass 2/13/23 at 244 lb. Having mid abdominal pain daily for 3 weeks, does not feel constipated, doesn't have epigastric pain since stopping the Omeprazole. She is drinking 60 ounces per day and is meeting protein recommendations. Exercise: walking. History: Developed nausea and vomiting and diarrhea, went to the ER, had a CT scan, told she had a stomach bug.     Weights:  208 lb 5/18/2023  12 wks  230 lb 3/2/2023  6 wks  235 lb 2/23/2023  10 days  244 lb 2/13/2023         bypass  252 lb 10/13/2022       initial    Past Medical History:   Diagnosis Date    Arthritis     HTN (hypertension)     Morbid obesity due to excess calories [facility])      Past Surgical History:   Procedure Laterality Date    CHOLECYSTECTOMY, LAPAROSCOPIC  2016    COLONOSCOPY  2018    HERNIA REPAIR      HIATAL HERNIA REPAIR N/A 2019    HYSTERECTOMY, TOTAL ABDOMINAL (CERVIX REMOVED)  2011    WILLA-EN-Y GASTRIC BYPASS N/A 2/13/2023    GASTRIC BYPASS WILLA-EN-Y LAPAROSCOPIC ROBOTIC XI performed by Danisha Hewitt MD at [facility] 11/9/2022    EGD BIOPSY performed by Danisha Hewitt MD at [facility] Medications  Prior to Visit Medications    Medication Sig Taking?  Authorizing Provider   Ferrous Sulfate (IRON PO) Take by mouth Yes Historical Provider, MD   Calcium Citrate-Vitamin D (CALCIUM + VIT D, BARIATRIC ADVANTAGE, CHEWABLE TABLET) Take 1 tablet by mouth daily Yes Historical Provider, MD   omeprazole (PRILOSEC) 20 MG delayed release capsule Take 1 capsule by mouth 2 times daily (before meals) Yes Prvaeena Dinero MD   Ascorbic Acid (VITAMIN C) 250 MG tablet Take 1 tablet by mouth daily Yes Historical Provider, MD   sucralfate (CARAFATE) 1 GM tablet Take 0.5 tablets by mouth 4 times daily Yes Danisha Hewitt MD   ondansetron (ZOFRAN-ODT) 4 MG

## 2023-05-18 NOTE — PATIENT INSTRUCTIONS
Please continue to take your vitamin and mineral supplements as instructed. If you received a blood work prescription today for laboratory monitoring due prior to your next routine follow-up visit, please have this blood work obtained 10 to 14 days prior to your next visit. It is important to fast for 12 hours prior to routine weight loss surgery blood work, EXCEPT for drinking water, to ensure accuracy of results. Please report nausea, vomiting, abdominal pain, or any other problems you experience to your surgeon. For problems related to weight loss surgery, it is best to go to [address] [place] [address] Emergency Department and have your surgeon paged.

## 2023-05-18 NOTE — PROGRESS NOTES
MEDICAL NUTRITION THERAPY (MNT) - Nutrition Care Plan   (The patient has been educated and given written education material that reinforces the following dietary guidelines for Bariatric Surgery)  Goal:  Patient able to verbalize the following dietary concepts:  3 to 4 ounce portions per meal     6 small meals daily      60 to 80 grams of protein   48 to 64 ounces of fluid daily (water)     Always consume protein item first with all meals   Pt is aware wt loss is pt controlled. Slow Meals - 30-35 chews per bite / Meals 30 - 45 minutes long            The RD / LD reviewed with the patient that the dietary goals of the bariatric patient is to ensure that patient is able to meet established nutritional needs for a Bariatric Surgery  Patient at this time in order to promote healing, prevent significant weight changes, prevent skin breakdown and abnormal lab values, prevent complications, and tolerance to diet and texture of foods. Pt is able to identify proper food choices and needed changes and is able to explain proper food preparation. RD / LD reviewed compliance with assigned diet stages, volume of food and drink consumed, timing of meals, amount of protein and energy intake, daily vitamin and mineral supplementation, identify food cravings and any adverse reactions associated with intake of food and drink. RD / LD uses behavioral tools such as goal setting, MI, and self-monitoring, to reinforce the anatomic and physiologic effects of the surgery, so that the described behaviors become more of a habit not simply a reaction to the altered anatomy. Care Plan:   Rd/Ld Addressed Food Records or 24-Hour Recall  Rd / Ld encouraged patient to exercise at least 30 minutes daily  Rd / Ld instructed patient on how to increase oral protein intake within the diet. Pt. can verbalize he / she will need to consume 60 - 80 grams.   Rd / Ld instructed the patient on how to increase the use of protein supplements within

## 2023-10-04 ENCOUNTER — OFFICE VISIT (OUTPATIENT)
Dept: BARIATRICS/WEIGHT MGMT | Age: 68
End: 2023-10-04
Payer: MEDICARE

## 2023-10-04 ENCOUNTER — INITIAL CONSULT (OUTPATIENT)
Dept: BARIATRICS/WEIGHT MGMT | Age: 68
End: 2023-10-04
Payer: MEDICARE

## 2023-10-04 VITALS
RESPIRATION RATE: 20 BRPM | TEMPERATURE: 97.6 F | BODY MASS INDEX: 28.56 KG/M2 | HEART RATE: 69 BPM | HEIGHT: 67 IN | SYSTOLIC BLOOD PRESSURE: 188 MMHG | WEIGHT: 182 LBS | DIASTOLIC BLOOD PRESSURE: 95 MMHG

## 2023-10-04 VITALS — HEIGHT: 67 IN | BODY MASS INDEX: 28.56 KG/M2 | WEIGHT: 182 LBS

## 2023-10-04 DIAGNOSIS — E66.3 OVERWEIGHT (BMI 25.0-29.9): Primary | ICD-10-CM

## 2023-10-04 DIAGNOSIS — Z71.3 DIETARY COUNSELING: ICD-10-CM

## 2023-10-04 DIAGNOSIS — K91.2 MALNUTRITION FOLLOWING GASTROINTESTINAL SURGERY: Primary | ICD-10-CM

## 2023-10-04 PROCEDURE — G8427 DOCREV CUR MEDS BY ELIG CLIN: HCPCS | Performed by: NURSE PRACTITIONER

## 2023-10-04 PROCEDURE — G8428 CUR MEDS NOT DOCUMENT: HCPCS | Performed by: DIETITIAN, REGISTERED

## 2023-10-04 PROCEDURE — 3077F SYST BP >= 140 MM HG: CPT | Performed by: NURSE PRACTITIONER

## 2023-10-04 PROCEDURE — G8417 CALC BMI ABV UP PARAM F/U: HCPCS | Performed by: NURSE PRACTITIONER

## 2023-10-04 PROCEDURE — G8417 CALC BMI ABV UP PARAM F/U: HCPCS | Performed by: DIETITIAN, REGISTERED

## 2023-10-04 PROCEDURE — 97803 MED NUTRITION INDIV SUBSEQ: CPT | Performed by: DIETITIAN, REGISTERED

## 2023-10-04 PROCEDURE — 1036F TOBACCO NON-USER: CPT | Performed by: NURSE PRACTITIONER

## 2023-10-04 PROCEDURE — 99214 OFFICE O/P EST MOD 30 MIN: CPT | Performed by: NURSE PRACTITIONER

## 2023-10-04 PROCEDURE — 1090F PRES/ABSN URINE INCON ASSESS: CPT | Performed by: NURSE PRACTITIONER

## 2023-10-04 PROCEDURE — 1123F ACP DISCUSS/DSCN MKR DOCD: CPT | Performed by: NURSE PRACTITIONER

## 2023-10-04 PROCEDURE — 3080F DIAST BP >= 90 MM HG: CPT | Performed by: NURSE PRACTITIONER

## 2023-10-04 PROCEDURE — G8400 PT W/DXA NO RESULTS DOC: HCPCS | Performed by: NURSE PRACTITIONER

## 2023-10-04 PROCEDURE — G8484 FLU IMMUNIZE NO ADMIN: HCPCS | Performed by: NURSE PRACTITIONER

## 2023-10-04 PROCEDURE — 99212 OFFICE O/P EST SF 10 MIN: CPT

## 2023-10-04 PROCEDURE — 3017F COLORECTAL CA SCREEN DOC REV: CPT | Performed by: NURSE PRACTITIONER

## 2023-10-04 NOTE — PROGRESS NOTES
Alin Topete  10/4/2023  Michelle Jonas    Amando-en- Y Gastric Bypass  6 month  Post-Operative Follow-up     Chief Complaint   Patient presents with    Follow Up After Procedure     6 mo LRYGB. Water intake is around 64 oz + daily. Protein through mostly foods. Vitamin intake is good. Bowel movements are good. Alin Topete is a 79 y.o. female who is 6 month  post Laparoscopic Amando-en-Y Gastric Bypass surgery. Reports that she is doing good, still has a lot of gas despite taking gas x. She is not having swallowing difficulty. Patient denies nausea and vomiting. Patient denies gastric reflux symptoms. Bowel movements are  normal per patient. Patient is not taking fiber supplements. Patient is compliant most of the time with the iron supplement and multivitamins and calcium + Vit D. She is meeting fluid recommendations of at least 64 ounces per day and is meeting protein recommendations. She  is exercising:  gym 2 times per week . Pggkmd=860 lb (82.6 kg)  Today's weight represents a total weight loss of 65 pounds since surgery with 0 pounds regained since the lowest weight. Prior to Admission medications    Medication Sig Start Date End Date Taking?  Authorizing Provider   Ferrous Sulfate (IRON PO) Take by mouth   Yes Davon Tucker MD   Calcium Citrate-Vitamin D (CALCIUM + VIT D, BARIATRIC ADVANTAGE, CHEWABLE TABLET) Take 1 tablet by mouth daily   Yes Davon Tucker MD   omeprazole (PRILOSEC) 20 MG delayed release capsule Take 1 capsule by mouth 2 times daily (before meals) 2/14/23  Yes Marta Gonzalez MD   Ascorbic Acid (VITAMIN C) 250 MG tablet Take 1 tablet by mouth daily   Yes Davon Tucker MD   sucralfate (CARAFATE) 1 GM tablet Take 0.5 tablets by mouth 4 times daily 2/2/23  Yes Nathanael Ariza MD   ondansetron (ZOFRAN-ODT) 4 MG disintegrating tablet Take 1 tablet by mouth every 8 hours as needed for Nausea or Vomiting 2/2/23  Yes Gagan Thomson

## 2023-10-04 NOTE — PATIENT INSTRUCTIONS
Magnesium 300-400 mg at night for leg cramps    Pro-biotics - consider Align OTC     Please continue to take your vitamin and mineral supplements as instructed. If you received a blood work prescription today for laboratory monitoring due prior to your next routine follow-up visit, please have this blood work obtained 10 to 14 days prior to your next visit. It is important to fast for 12 hours prior to routine weight loss surgery blood work, EXCEPT for drinking water, to ensure accuracy of results. Please report nausea, vomiting, abdominal pain, or any other problems you experience to your surgeon. For problems related to weight loss surgery, it is best to go to [facility] Emergency Department and have your surgeon paged.

## 2024-02-07 ENCOUNTER — TELEPHONE (OUTPATIENT)
Dept: BARIATRICS/WEIGHT MGMT | Age: 69
End: 2024-02-07

## 2024-02-22 ENCOUNTER — OFFICE VISIT (OUTPATIENT)
Dept: BARIATRICS/WEIGHT MGMT | Age: 69
End: 2024-02-22
Payer: MEDICARE

## 2024-02-22 VITALS
DIASTOLIC BLOOD PRESSURE: 92 MMHG | HEART RATE: 99 BPM | BODY MASS INDEX: 28.09 KG/M2 | HEIGHT: 67 IN | SYSTOLIC BLOOD PRESSURE: 160 MMHG | WEIGHT: 179 LBS

## 2024-02-22 DIAGNOSIS — E43 UNSPECIFIED SEVERE PROTEIN-CALORIE MALNUTRITION (HCC): ICD-10-CM

## 2024-02-22 DIAGNOSIS — E46 MALNUTRITION, UNSPECIFIED TYPE (HCC): Primary | ICD-10-CM

## 2024-02-22 PROCEDURE — 3017F COLORECTAL CA SCREEN DOC REV: CPT | Performed by: NURSE PRACTITIONER

## 2024-02-22 PROCEDURE — 99211 OFF/OP EST MAY X REQ PHY/QHP: CPT

## 2024-02-22 PROCEDURE — 3080F DIAST BP >= 90 MM HG: CPT | Performed by: NURSE PRACTITIONER

## 2024-02-22 PROCEDURE — 1036F TOBACCO NON-USER: CPT | Performed by: NURSE PRACTITIONER

## 2024-02-22 PROCEDURE — 3077F SYST BP >= 140 MM HG: CPT | Performed by: NURSE PRACTITIONER

## 2024-02-22 PROCEDURE — 99213 OFFICE O/P EST LOW 20 MIN: CPT | Performed by: NURSE PRACTITIONER

## 2024-02-22 PROCEDURE — G8417 CALC BMI ABV UP PARAM F/U: HCPCS | Performed by: NURSE PRACTITIONER

## 2024-02-22 PROCEDURE — 1123F ACP DISCUSS/DSCN MKR DOCD: CPT | Performed by: NURSE PRACTITIONER

## 2024-02-22 PROCEDURE — 1090F PRES/ABSN URINE INCON ASSESS: CPT | Performed by: NURSE PRACTITIONER

## 2024-02-22 PROCEDURE — G8427 DOCREV CUR MEDS BY ELIG CLIN: HCPCS | Performed by: NURSE PRACTITIONER

## 2024-02-22 PROCEDURE — G8400 PT W/DXA NO RESULTS DOC: HCPCS | Performed by: NURSE PRACTITIONER

## 2024-02-22 PROCEDURE — G8484 FLU IMMUNIZE NO ADMIN: HCPCS | Performed by: NURSE PRACTITIONER

## 2024-02-22 RX ORDER — MAGNESIUM 30 MG
30 TABLET ORAL 2 TIMES DAILY
COMMUNITY

## 2024-02-22 RX ORDER — POTASSIUM ACETATE 3.93 G/20ML
INJECTION, SOLUTION, CONCENTRATE INTRAVENOUS
COMMUNITY

## 2024-02-22 RX ORDER — VALSARTAN 160 MG/1
160 TABLET ORAL DAILY
COMMUNITY

## 2024-02-22 NOTE — PATIENT INSTRUCTIONS
Please continue to take your vitamin and mineral supplements as instructed.      If you received a blood work prescription today for laboratory monitoring due prior to your next routine follow-up visit, please have this blood work obtained 10 to 14 days prior to your next visit.  It is important to fast for 12 hours prior to routine weight loss surgery blood work, EXCEPT for drinking water, to ensure accuracy of results.    Please report nausea, vomiting, abdominal pain, or any other problems you experience to your surgeon.  For problems related to weight loss surgery, it is best to go to [facility] Emergency Department and have your surgeon paged.    Last Surgical Weight Loss:      2/22/2024    12:48 PM   Surgical Weight Loss Tracker   Consult Date 10/13/2022   Initial Height 5' 7\"   Initial Weight 252 lb   Initial BMI 39.46   Ideal Body Weight 163 lb   Surgery Date 2/13/2023   Pre-Surgical Height 5' 7\"   Pre-Surgical Weight 247 lb   Pre Surgery BMI 38.68   Weight to Lose 84 lb   Date 2/22/2024   Height 5' 7\"   Weight 179 lb   BMI 28.03   Weight Change -68 lb   Total Weight Change -68 lb   % EBWL 81%

## 2024-02-22 NOTE — PROGRESS NOTES
Eliana Stanley  2/22/2024  [facility]    Amando-en- Y Gastric Bypass  1 year Post-Operative Follow-up     Chief Complaint   Patient presents with    Bariatrics Post Op Follow Up     LRYGB 1 yr po. Water intake 64oz. Vitamins daily. Protein through shakes and food. Bm getting back to normal.       Eliana Stanley is a 68 y.o. female who is 1 year  post Laparoscopic Amando-en-Y Gastric Bypass surgery.  Reports that she is doing it. She is not having swallowing difficulty. Patient denies nausea and vomiting, reports that she had diarrhea, was sick and in the ED. She was told her potassium was low and is now on an oral supplements.  Patient denies gastric reflux symptoms. Bowel movements are  normal per patient. Patient is not taking fiber supplements, which include none.     Patient is compliant most of the time with the b12, vit c, vit d3, no MVI. She is meeting fluid recommendations of at least 64 ounces per day and is not meeting protein recommendations. She  is not exercising: no regular exercise.     She now has a blood clot in her upper arm from the IV she had when she was in the ED with the diarrhea as above.     Last Surgical Weight Loss:      2/22/2024    12:48 PM   Surgical Weight Loss Tracker   Consult Date 10/13/2022   Initial Height 5' 7\"   Initial Weight 252 lb   Initial BMI 39.46   Ideal Body Weight 163 lb   Surgery Date 2/13/2023   Pre-Surgical Height 5' 7\"   Pre-Surgical Weight 247 lb   Pre Surgery BMI 38.68   Weight to Lose 84 lb   Date 2/22/2024   Height 5' 7\"   Weight 179 lb   BMI 28.03   Weight Change -68 lb   Total Weight Change -68 lb   % EBWL 81%       Weight=81.2 kg (179 lb)  Today's weight represents a total weight loss of 68 pounds since surgery with 0 pounds regained since the lowest weight.    Prior to Admission medications    Medication Sig Start Date End Date Taking? Authorizing Provider   valsartan (DIOVAN) 160 MG tablet Take 1 tablet by mouth daily   Yes Provider,

## 2024-09-12 ENCOUNTER — TELEPHONE (OUTPATIENT)
Dept: BARIATRICS/WEIGHT MGMT | Age: 69
End: 2024-09-12

## 2024-10-01 ENCOUNTER — OFFICE VISIT (OUTPATIENT)
Dept: BARIATRICS/WEIGHT MGMT | Age: 69
End: 2024-10-01
Payer: MEDICARE

## 2024-10-01 VITALS
WEIGHT: 161 LBS | TEMPERATURE: 98.7 F | HEART RATE: 80 BPM | DIASTOLIC BLOOD PRESSURE: 92 MMHG | RESPIRATION RATE: 20 BRPM | BODY MASS INDEX: 25.27 KG/M2 | HEIGHT: 67 IN | SYSTOLIC BLOOD PRESSURE: 158 MMHG

## 2024-10-01 DIAGNOSIS — R10.13 EPIGASTRIC PAIN: ICD-10-CM

## 2024-10-01 DIAGNOSIS — E46 MALNUTRITION, UNSPECIFIED TYPE (HCC): Primary | ICD-10-CM

## 2024-10-01 PROCEDURE — 1123F ACP DISCUSS/DSCN MKR DOCD: CPT | Performed by: NURSE PRACTITIONER

## 2024-10-01 PROCEDURE — G8484 FLU IMMUNIZE NO ADMIN: HCPCS | Performed by: NURSE PRACTITIONER

## 2024-10-01 PROCEDURE — G8417 CALC BMI ABV UP PARAM F/U: HCPCS | Performed by: NURSE PRACTITIONER

## 2024-10-01 PROCEDURE — G8400 PT W/DXA NO RESULTS DOC: HCPCS | Performed by: NURSE PRACTITIONER

## 2024-10-01 PROCEDURE — 3080F DIAST BP >= 90 MM HG: CPT | Performed by: NURSE PRACTITIONER

## 2024-10-01 PROCEDURE — 3017F COLORECTAL CA SCREEN DOC REV: CPT | Performed by: NURSE PRACTITIONER

## 2024-10-01 PROCEDURE — 99211 OFF/OP EST MAY X REQ PHY/QHP: CPT

## 2024-10-01 PROCEDURE — 99214 OFFICE O/P EST MOD 30 MIN: CPT | Performed by: NURSE PRACTITIONER

## 2024-10-01 PROCEDURE — 3077F SYST BP >= 140 MM HG: CPT | Performed by: NURSE PRACTITIONER

## 2024-10-01 PROCEDURE — 1036F TOBACCO NON-USER: CPT | Performed by: NURSE PRACTITIONER

## 2024-10-01 PROCEDURE — 1090F PRES/ABSN URINE INCON ASSESS: CPT | Performed by: NURSE PRACTITIONER

## 2024-10-01 PROCEDURE — G8427 DOCREV CUR MEDS BY ELIG CLIN: HCPCS | Performed by: NURSE PRACTITIONER

## 2024-10-01 RX ORDER — RIVAROXABAN 20 MG/1
TABLET, FILM COATED ORAL
COMMUNITY

## 2024-10-01 RX ORDER — SUCRALFATE 1 G/1
0.5 TABLET ORAL 4 TIMES DAILY
Qty: 60 TABLET | Refills: 1 | Status: SHIPPED | OUTPATIENT
Start: 2024-10-01 | End: 2024-11-30

## 2024-10-01 NOTE — PROGRESS NOTES
keep the bowels regular. Try to exercise 7 days per week, maintain adequate variety and balance. Always notify the clinic if you have any medical problems.     Follow up in 6 months, or sooner if needed.      Physician Signature: Electronically signed by LINDSAY Ortega CNP

## 2024-10-01 NOTE — PATIENT INSTRUCTIONS
Please continue to take your vitamin and mineral supplements as instructed.      If you received a blood work prescription today for laboratory monitoring due prior to your next routine follow-up visit, please have this blood work obtained 10 to 14 days prior to your next visit.  It is important to fast for 12 hours prior to routine weight loss surgery blood work, EXCEPT for drinking water, to ensure accuracy of results.    Please report nausea, vomiting, abdominal pain, or any other problems you experience to your surgeon.  For problems related to weight loss surgery, it is best to go to [facility] Emergency Department and have your surgeon paged.    Last Surgical Weight Loss:      2/22/2024    12:48 PM 10/1/2024    10:26 AM   Surgical Weight Loss Tracker   Consult Date 10/13/2022    Initial Height 5' 7\"    Initial Weight 252 lb    Initial BMI 39.46    Ideal Body Weight 163 lb    Surgery Date 2/13/2023    Pre-Surgical Height 5' 7\"    Pre-Surgical Weight 247 lb    Pre Surgery BMI 38.68    Weight to Lose 84 lb    Date 2/22/2024 10/1/2024   Height 5' 7\" 5' 7\"   Weight 179 lb 161 lb   BMI 28.03 25.21   Weight Change -68 lb -18 lb   Total Weight Change -68 lb -86 lb   % EBWL 81% 102%

## 2024-11-13 ENCOUNTER — TELEPHONE (OUTPATIENT)
Dept: BARIATRICS/WEIGHT MGMT | Age: 69
End: 2024-11-13

## 2024-11-13 ENCOUNTER — PREP FOR PROCEDURE (OUTPATIENT)
Dept: BARIATRICS/WEIGHT MGMT | Age: 69
End: 2024-11-13

## 2024-11-13 DIAGNOSIS — R47.02 DYSPHASIA: ICD-10-CM

## 2024-11-13 PROBLEM — R10.9 PAIN, ABDOMINAL: Status: ACTIVE | Noted: 2024-11-13

## 2024-11-13 NOTE — TELEPHONE ENCOUNTER
Prior Authorization Form  DEMOGRAPHICS:    Patient Name:  Jose Stanley  Patient :  1955            Insurance:  Payor: [facility] MEDICARE / Plan: UNITEDHEALTHCARE DUAL COMPLETE / Product Type: *No Product type* /   Insurance ID Number:    Payer/Plan Subscr  Sex Relation Sub. Ins. ID Effective Group Num   1. [facility] MEDICARE JOSE GILBERT 1955 Female Self [id number] 3/1/22 [id number]                                   [address]   2. [place]ICAID [place] -* JOSE STANLEY 1955 Female Self [id number] 22                                    [address]         DIAGNOSIS & PROCEDURE:    Procedure/Operation: egd           CPT Code: 45650    Diagnosis:  abdominal pain    ICD10 Code: r10.9    Location:  [facility]    Surgeon:  Mathieu    SCHEDULING INFORMATION:    Date: 24    Time:               Anesthesia:  MAC/TIVA                                                       Status:  Outpatient        Special Comments:         Electronically signed by GLENNA BROOKS MA on 2024 at 10:38 AM

## 2024-11-25 NOTE — PROGRESS NOTES
[facility]                                                                                                                    PRE OP INSTRUCTIONS FOR  Eliana Stanley        Date: 11/25/2024    Date of surgery: 11/26/24   Arrival Time: Hospital will call you between 5pm and 7pm with your final arrival time for surgery. Go to front of hospital and check in at information desk.    Nothing by mouth (NPO) as instructed. May have clear liquids up to 2 hours prior to surgery. Nothing solid after midnight. Examples: water, apple juice, black coffee, plain tea    Take the following medications with a small sip of water on the morning of Surgery: none     Diabetics may take half the evening dose of insulin but none after midnight.  If you feel symptomatic or have low blood sugar morning of surgery drink 1-2 ounces of apple juice only. If you take a weekly insulin injection _______________, stop 7 days prior to surgery. If you take _______________, stop 3-4 days prior to surgery.    Aspirin, Ibuprofen, Advil, Naproxen, other Anti-inflammatory products should be stopped before surgery as directed by your surgeon, cardiologist, or primary care Doctor. Herbal supplements and Vitamin E should be stopped five days prior.  May take Tylenol unless instructed otherwise by your surgeon.    Check with your Doctor regarding stopping Plavix, Coumadin, Lovenox, Eliquis, Effient, or other blood thinners such as, pradaxa, lixiana, xaralto and savaysa.    Do not smoke, chew tobacco, vape, or use illicit drugs and do not drink any alcoholic beverages 24 hours prior to surgery.    You may brush your teeth the morning of surgery.      You MUST make arrangements for a responsible adult, 18 and over, to take you home after your surgery. You will not be allowed to leave alone or drive yourself home.  You will need someone stay with you the first 24 hrs. Your surgery will be cancelled if you do not have a ride home or  you have any further questions.   Pre Admit Testing           [phone]     [facility] [phone]

## 2024-11-26 ENCOUNTER — HOSPITAL ENCOUNTER (OUTPATIENT)
Age: 69
Setting detail: OUTPATIENT SURGERY
Discharge: HOME | End: 2024-11-26
Attending: SURGERY | Admitting: SURGERY
Payer: MEDICARE

## 2024-11-26 ENCOUNTER — ANESTHESIA EVENT (OUTPATIENT)
Dept: ENDOSCOPY | Age: 69
End: 2024-11-26
Payer: MEDICARE

## 2024-11-26 ENCOUNTER — ANESTHESIA (OUTPATIENT)
Dept: ENDOSCOPY | Age: 69
End: 2024-11-26
Payer: MEDICARE

## 2024-11-26 VITALS
TEMPERATURE: 97.5 F | OXYGEN SATURATION: 99 % | WEIGHT: 165 LBS | BODY MASS INDEX: 25.9 KG/M2 | RESPIRATION RATE: 15 BRPM | DIASTOLIC BLOOD PRESSURE: 82 MMHG | SYSTOLIC BLOOD PRESSURE: 167 MMHG | HEART RATE: 69 BPM | HEIGHT: 67 IN

## 2024-11-26 DIAGNOSIS — R10.13 EPIGASTRIC PAIN: ICD-10-CM

## 2024-11-26 DIAGNOSIS — R10.9 PAIN, ABDOMINAL: ICD-10-CM

## 2024-11-26 PROBLEM — K56.699 STRICTURE OF JEJUNUM (HCC): Status: ACTIVE | Noted: 2024-11-26

## 2024-11-26 PROCEDURE — 2580000003 HC RX 258: Performed by: NURSE ANESTHETIST, CERTIFIED REGISTERED

## 2024-11-26 PROCEDURE — 2709999900 HC NON-CHARGEABLE SUPPLY: Performed by: SURGERY

## 2024-11-26 PROCEDURE — 43245 EGD DILATE STRICTURE: CPT | Performed by: SURGERY

## 2024-11-26 PROCEDURE — 3609012400 HC EGD TRANSORAL BIOPSY SINGLE/MULTIPLE: Performed by: SURGERY

## 2024-11-26 PROCEDURE — 7100000010 HC PHASE II RECOVERY - FIRST 15 MIN: Performed by: SURGERY

## 2024-11-26 PROCEDURE — C1726 CATH, BAL DIL, NON-VASCULAR: HCPCS | Performed by: SURGERY

## 2024-11-26 PROCEDURE — 3700000001 HC ADD 15 MINUTES (ANESTHESIA): Performed by: SURGERY

## 2024-11-26 PROCEDURE — 2580000003 HC RX 258: Performed by: SURGERY

## 2024-11-26 PROCEDURE — 87077 CULTURE AEROBIC IDENTIFY: CPT

## 2024-11-26 PROCEDURE — 6360000002 HC RX W HCPCS: Performed by: NURSE ANESTHETIST, CERTIFIED REGISTERED

## 2024-11-26 PROCEDURE — 3700000000 HC ANESTHESIA ATTENDED CARE: Performed by: SURGERY

## 2024-11-26 PROCEDURE — 7100000011 HC PHASE II RECOVERY - ADDTL 15 MIN: Performed by: SURGERY

## 2024-11-26 PROCEDURE — 43239 EGD BIOPSY SINGLE/MULTIPLE: CPT | Performed by: SURGERY

## 2024-11-26 PROCEDURE — 3609017700 HC EGD DILATION GASTRIC/DUODENAL STRICTURE: Performed by: SURGERY

## 2024-11-26 RX ORDER — SODIUM CHLORIDE 9 MG/ML
INJECTION, SOLUTION INTRAVENOUS CONTINUOUS
Status: DISCONTINUED | OUTPATIENT
Start: 2024-11-26 | End: 2024-11-26 | Stop reason: HOSPADM

## 2024-11-26 RX ORDER — PROPOFOL 10 MG/ML
INJECTION, EMULSION INTRAVENOUS
Status: DISCONTINUED | OUTPATIENT
Start: 2024-11-26 | End: 2024-11-26 | Stop reason: SDUPTHER

## 2024-11-26 RX ORDER — SODIUM CHLORIDE 9 MG/ML
INJECTION, SOLUTION INTRAVENOUS
Status: DISCONTINUED | OUTPATIENT
Start: 2024-11-26 | End: 2024-11-26 | Stop reason: SDUPTHER

## 2024-11-26 RX ORDER — SUCRALFATE 1 G/1
0.5 TABLET ORAL EVERY 4 HOURS
Qty: 90 TABLET | Refills: 1 | Status: SHIPPED | OUTPATIENT
Start: 2024-11-26 | End: 2025-01-25

## 2024-11-26 RX ADMIN — PROPOFOL 180 MG: 10 INJECTION, EMULSION INTRAVENOUS at 08:17

## 2024-11-26 RX ADMIN — SODIUM CHLORIDE: 9 INJECTION, SOLUTION INTRAVENOUS at 07:33

## 2024-11-26 RX ADMIN — SODIUM CHLORIDE: 9 INJECTION, SOLUTION INTRAVENOUS at 08:14

## 2024-11-26 ASSESSMENT — PAIN DESCRIPTION - DESCRIPTORS
DESCRIPTORS: CRAMPING
DESCRIPTORS: CRAMPING

## 2024-11-26 ASSESSMENT — PAIN - FUNCTIONAL ASSESSMENT
PAIN_FUNCTIONAL_ASSESSMENT: 0-10

## 2024-11-26 NOTE — ANESTHESIA PRE PROCEDURE
K 4.4 02/09/2023 10:55 AM     02/09/2023 10:55 AM    CO2 25 02/09/2023 10:55 AM    BUN 18 02/09/2023 10:55 AM    CREATININE 0.7 02/09/2023 10:55 AM    LABGLOM >60 02/09/2023 10:55 AM    GLUCOSE 101 02/09/2023 10:55 AM    CALCIUM 9.2 02/09/2023 10:55 AM    BILITOT 0.4 02/09/2023 10:55 AM    ALKPHOS 71 02/09/2023 10:55 AM    AST 16 02/09/2023 10:55 AM    ALT 15 02/09/2023 10:55 AM       POC Tests: No results for input(s): \"POCGLU\", \"POCNA\", \"POCK\", \"POCCL\", \"POCBUN\", \"POCHEMO\", \"POCHCT\" in the last 72 hours.    Coags: No results found for: \"PROTIME\", \"INR\", \"APTT\"    HCG (If Applicable): No results found for: \"PREGTESTUR\", \"PREGSERUM\", \"HCG\", \"HCGQUANT\"     ABGs: No results found for: \"PHART\", \"PO2ART\", \"TVF0ZRR\", \"PEW8YVA\", \"BEART\", \"B3SRJXQM\"     Type & Screen (If Applicable):  No results found for: \"ABORH\", \"LABANTI\"    Drug/Infectious Status (If Applicable):  No results found for: \"HIV\", \"HEPCAB\"    COVID-19 Screening (If Applicable): No results found for: \"COVID19\"        Anesthesia Evaluation    Airway: Mallampati: II  TM distance: >3 FB   Neck ROM: full  Mouth opening: > = 3 FB   Dental: normal exam         Pulmonary:Negative Pulmonary ROS breath sounds clear to auscultation                             Cardiovascular:    (+) hypertension:        Rhythm: regular  Rate: normal                    Neuro/Psych:   Negative Neuro/Psych ROS              GI/Hepatic/Renal:   (+) hiatal hernia, GERD:, morbid obesity          Endo/Other:    (+) : arthritis:..                 Abdominal:   (+) obese          Vascular: negative vascular ROS.         Other Findings:             Anesthesia Plan      MAC     ASA 3             Anesthetic plan and risks discussed with patient.        Attending anesthesiologist reviewed and agrees with Preprocedure content                LINDSAY Padron - CRNA   11/26/2024

## 2024-11-26 NOTE — H&P
Eliana Stanley  11/26/2024  H&P       Eliana Stanley is a 69 y.o. female who weighs 75.8 kg (167 lb) post robotic Willa-en-Y Gastric Bypass 2/13/23 at 244 lb. Having mid abdominal pain and nausea when she eats, no vomiting, does not happen with liquids, does not feel constipated, doesn't have epigastric pain since stopping the Omeprazole. She is drinking 60 ounces per day and is meeting protein recommendations. Exercise: walking.    History:   10/2024 restarted Omeprazole and Carafate for mid abdominal pain, no improvement  Started on Xarelto 5/2024 after superficial arm blood clots from arm IV's.    Weights:  167 lb 11/26/2024  18 mnths  208 lb 5/18/2023  12 wks  230 lb 3/2/2023  6 wks  235 lb 2/23/2023  10 days  244 lb 2/13/2023         bypass  252 lb 10/13/2022       initial    Past Medical History:   Diagnosis Date    Arthritis     HTN (hypertension)     Morbid obesity due to excess calories      Past Surgical History:   Procedure Laterality Date    CHOLECYSTECTOMY, LAPAROSCOPIC  2016    COLONOSCOPY  2018    ESOPHAGUS SURGERY      banded throat    HERNIA REPAIR      HIATAL HERNIA REPAIR N/A 2019    HYSTERECTOMY, TOTAL ABDOMINAL (CERVIX REMOVED)  2011    WILLA-EN-Y GASTRIC BYPASS N/A 02/13/2023    GASTRIC BYPASS WILLA-EN-Y LAPAROSCOPIC ROBOTIC XI performed by David Murdock MD at [facility] OR    UPPER GASTROINTESTINAL ENDOSCOPY N/A 11/09/2022    EGD BIOPSY performed by David Murdock MD at [facility] ENDOSCOPY       Home Medications  Prior to Visit Medications    Medication Sig Taking? Authorizing Provider   XARELTO 20 MG TABS tablet TAKE 1 TABLET BY MOUTH ONCE DAILY WITH FOOD Oral for 30 Days Yes Provider, MD Davon   Multiple Vitamins-Minerals (HAIR SKIN AND NAILS FORMULA) TABS Take by mouth  Provider, MD Davon   sucralfate (CARAFATE) 1 GM tablet Take 0.5 tablets by mouth 4 times daily  Inez Farfan APRN - CNP   omeprazole (PRILOSEC) 20 MG delayed release capsule Take 1 capsule by mouth daily  Provider,

## 2024-11-26 NOTE — DISCHARGE INSTRUCTIONS
Liquid protein diet for 4 days, then pureed diet for 10 days.  Omeprazole 20 mg twice a day and Carafate 1/2 tablet every 3-4 hours while awake to coat the pouch until the swelling resolves.  Will need another dilation in 4 weeks.

## 2024-11-26 NOTE — ANESTHESIA POSTPROCEDURE EVALUATION
Department of Anesthesiology  Postprocedure Note    Patient: Eliana Stanley  MRN: [id number]  YOB: 1955  Date of evaluation: 11/26/2024    Procedure Summary       Date: 11/26/24 Room / Location: [place] [address] / [facility]    Anesthesia Start: 0814 Anesthesia Stop: 0831    Procedures:       ESOPHAGOGASTRODUODENOSCOPY BIOPSY      ESOPHAGOGASTRODUODENOSCOPY DILATION BALLOON Diagnosis:       Pain, abdominal      (Pain, abdominal [R10.9])    Surgeons: David Murdock MD Responsible Provider: Osmany Mckeon MD    Anesthesia Type: MAC ASA Status: 3            Anesthesia Type: No value filed.    Luh Phase I: Luh Score: 10    Luh Phase II: Luh Score: 10    Anesthesia Post Evaluation    Patient location during evaluation: PACU  Patient participation: complete - patient participated  Level of consciousness: awake  Pain score: 2  Airway patency: patent  Nausea & Vomiting: no nausea  Cardiovascular status: blood pressure returned to baseline  Respiratory status: acceptable  Hydration status: euvolemic  Pain management: adequate    No notable events documented.

## 2024-11-26 NOTE — OP NOTE
Eliana Stanley  [facility]    Operative Report    DATE OF PROCEDURE: 11/26/2024    SURGEON: Dr. David Murdock MD     Surgical Assistant: Dana Chandler RN     PREOPERATIVE DIAGNOSES:      Esophageal reflux     Morbid obesity     Pain, abdominal     POSTOPERATIVE DIAGNOSES:   11/26/2024 EGD with biopsy and 14 mm balloon dilation of a very tight, 3 mm anastomotic stricture with a 6 mm ulcer and inflammation in the jejunum, no esophagitis, no hiatal hernia, no gastritis.     OPERATION:    EGD with biopsy and balloon dilation jejunal anastomotic stricture    ANESTHESIA: LMAC.  BLOOD LOSS: none  SPECIMEN: gastric pouch biopsy for FRENCH    CONSENT AND INDICATIONS:  This is a 69 y.o. year old female with upper abdominal pain after eating despite Omeprazole and Carafate. I have discussed with the patient the indication, alternatives, and the possible risks and/or complications of the planned procedure and the anesthesia methods. The patient appears to understand and agree to proceed.    Monitoring and Safety: Anaesthesia monitored vital signs, BP cuff, pulse oximetry, cardiac monitor and level of consciousness until recovered.     Complications: none    OPERATIONS: The patient was placed on the table and sedated by Anaesthesia.  Bite block was placed. A lubricated scope was easily passed into the upper esophagus which looked normal. The distal esophagus was normal. The scope was passed into the stomach pouch which had normal gastric mucosa. Biopsy was taken to check for H. pylori. The anastomosis was very tight at 3 mm. The scope was pushed through and there was inflammation and a 6 mm ulcer in the jejunal mucosa. The scope was passed as far down as possible and no pathology was seen.  The 12-15 balloon was placed through the scope and left in the stricture, inflated to 12 mm and slowly increased to 14 mm, it was tight so the balloon was deflated and removed.  The patient tolerated

## 2024-11-27 LAB
HELIOBACTER PYLORI ID: NEGATIVE
SOURCE, 60200063: NORMAL

## 2024-12-05 ENCOUNTER — TELEPHONE (OUTPATIENT)
Dept: BARIATRICS/WEIGHT MGMT | Age: 69
End: 2024-12-05

## 2024-12-05 ENCOUNTER — SCHEDULED TELEPHONE ENCOUNTER (OUTPATIENT)
Dept: BARIATRICS/WEIGHT MGMT | Age: 69
End: 2024-12-05
Payer: MEDICARE

## 2024-12-05 DIAGNOSIS — K56.699 STRICTURE OF JEJUNUM (HCC): Primary | ICD-10-CM

## 2024-12-05 PROBLEM — R47.02 DYSPHASIA: Status: ACTIVE | Noted: 2024-11-13

## 2024-12-05 PROCEDURE — 99442 PR PHYS/QHP TELEPHONE EVALUATION 11-20 MIN: CPT | Performed by: SURGERY

## 2024-12-05 NOTE — PROGRESS NOTES
Eliana Stanley  12/5/2024  Bariatric Office phone visit    Consent:  The patient is aware that she may receive a bill for this service, depending on insurance coverage, and has provided verbal consent to proceed: Yes    Pt at home, I'm in the office, no one helped.    I affirm this is a Patient Initiated Episode with an Established Patient who has not had a related appointment within my department in the past 7 days or scheduled within the next 24 hours.    Patient identification was verified at the start of the visit: Yes    Total Time: minutes: 15 minutes         Eliana Stanley is a 69 y.o. female who weighs 167 lb   post robotic Amando-en-Y Gastric Bypass 2/13/23 at 244 lb. 11/26/2024 EGD with biopsy and 14 mm balloon dilation of a very tight, 3 mm anastomotic stricture with a 6 mm ulcer and inflammation in the jejunum, no esophagitis, no hiatal hernia, no gastritis.     No more abdominal pain or nausea on the Omeprazole and Carafate and liquid diet. She is drinking 60 ounces per day and is meeting protein recommendations. Exercise: walking.    History:   11/26/2024 EGD 14 mm balloon dilation of a very tight, 3 mm anastomotic stricture with a 6 mm ulcer  10/2024 restarted Omeprazole and Carafate for mid abdominal pain  Started on Xarelto 5/2024 after superficial arm blood clots from arm IV's.    Weights:  167 lb 11/26/2024  18 mnths  208 lb 5/18/2023  12 wks  230 lb 3/2/2023  6 wks  235 lb 2/23/2023  10 days  244 lb 2/13/2023         bypass  252 lb 10/13/2022       initial    Past Medical History:   Diagnosis Date    Arthritis     HTN (hypertension)     Morbid obesity due to excess calories      Past Surgical History:   Procedure Laterality Date    CHOLECYSTECTOMY, LAPAROSCOPIC  2016    COLONOSCOPY  2018    ESOPHAGUS SURGERY      banded throat    HERNIA REPAIR      HIATAL HERNIA REPAIR N/A 2019    HYSTERECTOMY, TOTAL ABDOMINAL (CERVIX REMOVED)  2011    AMANDO-EN-Y GASTRIC BYPASS N/A 02/13/2023    GASTRIC BYPASS

## 2024-12-05 NOTE — TELEPHONE ENCOUNTER
Prior Authorization Form  DEMOGRAPHICS:    Patient Name:  Jose Stanley  Patient :  1955            Insurance:  Payor: [facility] MEDICARE / Plan: UNITEDHEALTHCARE DUAL COMPLETE / Product Type: *No Product type* /   Insurance ID Number:    Payer/Plan Subscr  Sex Relation Sub. Ins. ID Effective Group Num   1. [facility] MEDICARE JOSE GILBERT 1955 Female Self [id number] 3/1/22 [id number]                                   [address]   2. [place]ICAID [place] -* JOSE STANLEY 1955 Female Self [id number] 22                                    [address]         DIAGNOSIS & PROCEDURE:    Procedure/Operation: egd           CPT Code: 38514    Diagnosis:  dysphasia    ICD10 Code: r13.10    Location:  [facility]    Surgeon:  Mathieu    SCHEDULING INFORMATION:    Date: 25    Time:               Anesthesia:  MAC/TIVA                                                       Status:  Outpatient        Special Comments:         Electronically signed by GLENNA BROOKS MA on 2024 at 9:57 AM

## 2025-01-17 NOTE — PROGRESS NOTES
[facility]                                                                                                                    PRE OP INSTRUCTIONS FOR  Eliana Stanley        Date: 1/17/2025    Date of surgery: 1/21/25   Arrival Time: Hospital will call you between 5pm and 7pm with your final arrival time for surgery    You may drink clear liquids up until 2 hours before your procedure. Clear liquids include water, black coffee, and apple juice. No solid foods for 8 hours pre procedure.     Gastric bypass patients- The night before surgery drink 2- 24 oz bottles of regular Gatorade at bedtime. Drink both within 10-15 minutes. (Insulin dependent patients drink 2- 24 oz bottles of sugar free Gatorade.) May have liquids up to 6 hours prior to surgery.    Total joint replacement- The night before surgery drink 2- 24 oz bottles of regular Gatorade at bedtime. Drink both within 10-15 minutes. (Insulin dependent patients drink 2- 24 oz bottles of sugar free Gatorade.) May have liquids up to 2 hours prior to surgery.      Take the following medications with a small sip of water on the morning of Surgery: none per pt     Diabetics may take 1/2 evening dose of insulin but none after midnight.  If you feel symptomatic or low blood sugar morning of surgery drink 1-2 ounces of apple juice only.    Diabetic patients- SGLT2 inhibitors (Farxiga, Jardiance) must be discontinued for 3-4 days before surgery. GLP-1 agonists (Trulicity, Ozempic, Victoza) weekly injectables must be held for one week prior to surgery.      Aspirin, Ibuprofen, Advil, Naproxen, Vitamin E and other Anti-inflammatory products should be stopped  before surgery  as directed by your physician.  Take Tylenol only unless instructed otherwise by your surgeon. Stop all herbal supplements 5 days pre op.     Check with your Doctor regarding stopping Plavix, Coumadin, Lovenox, Eliquis, Effient, Xarelto, Pradaxa, Savaysa, Lixiana, or other blood

## 2025-01-20 ENCOUNTER — ANESTHESIA EVENT (OUTPATIENT)
Dept: ENDOSCOPY | Age: 70
End: 2025-01-20
Payer: MEDICARE

## 2025-01-21 ENCOUNTER — HOSPITAL ENCOUNTER (OUTPATIENT)
Age: 70
Setting detail: OUTPATIENT SURGERY
Discharge: HOME | End: 2025-01-21
Attending: SURGERY | Admitting: SURGERY
Payer: MEDICARE

## 2025-01-21 ENCOUNTER — ANESTHESIA (OUTPATIENT)
Dept: ENDOSCOPY | Age: 70
End: 2025-01-21
Payer: MEDICARE

## 2025-01-21 VITALS
HEIGHT: 67 IN | OXYGEN SATURATION: 100 % | TEMPERATURE: 98.1 F | RESPIRATION RATE: 22 BRPM | WEIGHT: 170 LBS | SYSTOLIC BLOOD PRESSURE: 160 MMHG | HEART RATE: 78 BPM | DIASTOLIC BLOOD PRESSURE: 90 MMHG | BODY MASS INDEX: 26.68 KG/M2

## 2025-01-21 LAB
25(OH)D3 SERPL-MCNC: 42.7 NG/ML (ref 30–100)
ALBUMIN SERPL-MCNC: 3.8 G/DL (ref 3.5–5.2)
ALP SERPL-CCNC: 84 U/L (ref 35–104)
ALT SERPL-CCNC: 13 U/L (ref 0–32)
ANION GAP SERPL CALCULATED.3IONS-SCNC: 9 MMOL/L (ref 7–16)
AST SERPL-CCNC: 14 U/L (ref 0–31)
BILIRUB SERPL-MCNC: 0.4 MG/DL (ref 0–1.2)
BUN SERPL-MCNC: 15 MG/DL (ref 6–23)
CALCIUM SERPL-MCNC: 8.8 MG/DL (ref 8.6–10.2)
CHLORIDE SERPL-SCNC: 110 MMOL/L (ref 98–107)
CO2 SERPL-SCNC: 25 MMOL/L (ref 22–29)
CREAT SERPL-MCNC: 0.7 MG/DL (ref 0.5–1)
ERYTHROCYTE [DISTWIDTH] IN BLOOD BY AUTOMATED COUNT: 13.2 % (ref 11.5–15)
FERRITIN SERPL-MCNC: 36 NG/ML
FOLATE SERPL-MCNC: 11.6 NG/ML (ref 4.8–24.2)
GFR, ESTIMATED: >90 ML/MIN/1.73M2
GLUCOSE SERPL-MCNC: 85 MG/DL (ref 74–99)
HCG UR QL: NEGATIVE
HCT VFR BLD AUTO: 44.3 % (ref 34–48)
HGB BLD-MCNC: 14.7 G/DL (ref 11.5–15.5)
MCH RBC QN AUTO: 30.4 PG (ref 26–35)
MCHC RBC AUTO-ENTMCNC: 33.2 G/DL (ref 32–34.5)
MCV RBC AUTO: 91.7 FL (ref 80–99.9)
PLATELET # BLD AUTO: 240 K/UL (ref 130–450)
PMV BLD AUTO: 8.9 FL (ref 7–12)
POTASSIUM SERPL-SCNC: 3.9 MMOL/L (ref 3.5–5)
PREALB SERPL-MCNC: 25 MG/DL (ref 20–40)
PROT SERPL-MCNC: 6.5 G/DL (ref 6.4–8.3)
RBC # BLD AUTO: 4.83 M/UL (ref 3.5–5.5)
SODIUM SERPL-SCNC: 144 MMOL/L (ref 132–146)
VIT B12 SERPL-MCNC: 983 PG/ML (ref 211–946)
WBC OTHER # BLD: 5.5 K/UL (ref 4.5–11.5)

## 2025-01-21 PROCEDURE — 2709999900 HC NON-CHARGEABLE SUPPLY: Performed by: SURGERY

## 2025-01-21 PROCEDURE — 84630 ASSAY OF ZINC: CPT

## 2025-01-21 PROCEDURE — 84590 ASSAY OF VITAMIN A: CPT

## 2025-01-21 PROCEDURE — 2580000003 HC RX 258

## 2025-01-21 PROCEDURE — 82728 ASSAY OF FERRITIN: CPT

## 2025-01-21 PROCEDURE — 84425 ASSAY OF VITAMIN B-1: CPT

## 2025-01-21 PROCEDURE — 3700000000 HC ANESTHESIA ATTENDED CARE: Performed by: SURGERY

## 2025-01-21 PROCEDURE — C1726 CATH, BAL DIL, NON-VASCULAR: HCPCS | Performed by: SURGERY

## 2025-01-21 PROCEDURE — 6360000002 HC RX W HCPCS

## 2025-01-21 PROCEDURE — 82607 VITAMIN B-12: CPT

## 2025-01-21 PROCEDURE — 84703 CHORIONIC GONADOTROPIN ASSAY: CPT

## 2025-01-21 PROCEDURE — 3609017700 HC EGD DILATION GASTRIC/DUODENAL STRICTURE: Performed by: SURGERY

## 2025-01-21 PROCEDURE — 85027 COMPLETE CBC AUTOMATED: CPT

## 2025-01-21 PROCEDURE — 2580000003 HC RX 258: Performed by: SURGERY

## 2025-01-21 PROCEDURE — 82306 VITAMIN D 25 HYDROXY: CPT

## 2025-01-21 PROCEDURE — 7100000010 HC PHASE II RECOVERY - FIRST 15 MIN: Performed by: SURGERY

## 2025-01-21 PROCEDURE — 36415 COLL VENOUS BLD VENIPUNCTURE: CPT

## 2025-01-21 PROCEDURE — 82746 ASSAY OF FOLIC ACID SERUM: CPT

## 2025-01-21 PROCEDURE — 80053 COMPREHEN METABOLIC PANEL: CPT

## 2025-01-21 PROCEDURE — 82525 ASSAY OF COPPER: CPT

## 2025-01-21 PROCEDURE — 43245 EGD DILATE STRICTURE: CPT | Performed by: SURGERY

## 2025-01-21 PROCEDURE — 7100000011 HC PHASE II RECOVERY - ADDTL 15 MIN: Performed by: SURGERY

## 2025-01-21 PROCEDURE — 3700000001 HC ADD 15 MINUTES (ANESTHESIA): Performed by: SURGERY

## 2025-01-21 PROCEDURE — 84134 ASSAY OF PREALBUMIN: CPT

## 2025-01-21 RX ORDER — SODIUM CHLORIDE 9 MG/ML
INJECTION, SOLUTION INTRAVENOUS CONTINUOUS
Status: DISCONTINUED | OUTPATIENT
Start: 2025-01-21 | End: 2025-01-21 | Stop reason: HOSPADM

## 2025-01-21 RX ORDER — SODIUM CHLORIDE 9 MG/ML
INJECTION, SOLUTION INTRAVENOUS
Status: DISCONTINUED | OUTPATIENT
Start: 2025-01-21 | End: 2025-01-21 | Stop reason: SDUPTHER

## 2025-01-21 RX ORDER — PROPOFOL 10 MG/ML
INJECTION, EMULSION INTRAVENOUS
Status: DISCONTINUED | OUTPATIENT
Start: 2025-01-21 | End: 2025-01-21 | Stop reason: SDUPTHER

## 2025-01-21 RX ADMIN — SODIUM CHLORIDE: 9 INJECTION, SOLUTION INTRAVENOUS at 09:34

## 2025-01-21 RX ADMIN — SODIUM CHLORIDE: 9 INJECTION, SOLUTION INTRAVENOUS at 08:07

## 2025-01-21 RX ADMIN — PROPOFOL 150 MG: 10 INJECTION, EMULSION INTRAVENOUS at 09:44

## 2025-01-21 ASSESSMENT — LIFESTYLE VARIABLES: SMOKING_STATUS: 0

## 2025-01-21 ASSESSMENT — PAIN - FUNCTIONAL ASSESSMENT: PAIN_FUNCTIONAL_ASSESSMENT: NONE - DENIES PAIN

## 2025-01-21 ASSESSMENT — PAIN SCALES - GENERAL: PAINLEVEL_OUTOF10: 0

## 2025-01-21 NOTE — ANESTHESIA POSTPROCEDURE EVALUATION
Department of Anesthesiology  Postprocedure Note    Patient: Eliana Stanley  MRN: [id number]  YOB: 1955  Date of evaluation: 1/21/2025    Procedure Summary       Date: 01/21/25 Room / Location: [place] [address] / [facility]    Anesthesia Start: 0934 Anesthesia Stop: 0948    Procedure: ESOPHAGOGASTRODUODENOSCOPY DILATION BALLOON Diagnosis:       Dysphasia      (Dysphasia [R47.02])    Surgeons: David Murdock MD Responsible Provider: Michael Monreal MD    Anesthesia Type: MAC ASA Status: 3            Anesthesia Type: No value filed.    Luh Phase I: Luh Score: 10    Luh Phase II: Luh Score: 10    Anesthesia Post Evaluation    Patient location during evaluation: PACU  Patient participation: complete - patient participated  Level of consciousness: awake and alert  Airway patency: patent  Nausea & Vomiting: no nausea and no vomiting  Cardiovascular status: hemodynamically stable  Respiratory status: acceptable  Hydration status: euvolemic  Pain management: satisfactory to patient        No notable events documented.

## 2025-01-21 NOTE — ANESTHESIA PRE PROCEDURE
Department of Anesthesiology  Preprocedure Note       Name:  Eliana Stanley   Age:  69 y.o.  :  1955                                          MRN:  [id number]         Date:  2025      Surgeon: Surgeon(s):  David Murdock MD    Procedure: Procedure(s):  ESOPHAGOGASTRODUODENOSCOPY    Medications prior to admission:   Prior to Admission medications    Medication Sig Start Date End Date Taking? Authorizing Provider   XARELTO 20 MG TABS tablet TAKE 1 TABLET BY MOUTH ONCE DAILY WITH FOOD Oral for 30 Days   Yes Davon Tucker MD   omeprazole (PRILOSEC) 20 MG delayed release capsule Take 1 capsule by mouth in the morning and at bedtime 24   David Murdock MD   sucralfate (CARAFATE) 1 GM tablet Take 0.5 tablets by mouth every 4 hours 24  David Murdock MD   Multiple Vitamins-Minerals (HAIR SKIN AND NAILS FORMULA) TABS Take by mouth    Davon Tucker MD   valsartan (DIOVAN) 160 MG tablet Take 1 tablet by mouth daily    Davon Tucker MD   Calcium Citrate-Vitamin D (CALCIUM + VIT D, BARIATRIC ADVANTAGE, CHEWABLE TABLET) Take 1 tablet by mouth daily    Davon Tucker MD   Ascorbic Acid (VITAMIN C) 250 MG tablet Take 1 tablet by mouth daily    Davon Tucker MD   vitamin D3 (CHOLECALCIFEROL) 10 MCG (400 UNIT) TABS tablet Take 1 tablet by mouth daily    Davon Tucker MD       Current medications:    Current Facility-Administered Medications   Medication Dose Route Frequency Provider Last Rate Last Admin   • 0.9 % sodium chloride infusion   IntraVENous Continuous David Murdock  mL/hr at 25 0807 New Bag at 25 0807       Allergies:  No Known Allergies    Problem List:    Patient Active Problem List   Diagnosis Code   • Arthritis M19.90   • HTN (hypertension) I10   • Morbid obesity due to excess calories E66.01   • Esophageal reflux K21.9   • Morbid obesity E66.01   • Pain, abdominal R10.9   • Stricture of jejunum (HCC) K56.699   •

## 2025-01-21 NOTE — OP NOTE
Eliana Stanley  [facility]    Operative Report    DATE OF PROCEDURE: 1/21/2025    SURGEON: Dr. David Murdock MD     Surgical Assistant: Dana Chandler RN     PRE-OP DIAGNOSIS:     Dysphasia     POSTOPERATIVE DIAGNOSES:    5 mm Anastomosis  Healing Anastomotic Ulcer    OPERATION:    EGD 1/21/2025 with 18 mm balloon dilation of a 5 mm anastomotic stricture, moderate inflammation with a healing anastomotic ulcer.    ANESTHESIA: LMAC.  BLOOD LOSS: none  SPECIMEN: none    CONSENT AND INDICATIONS:  This is a 69 y.o. year old female with mild dysphagia post dilation of a stricture and treatment of an anastomotic ulcer.  She needs to have another EGD to evaluate the ulcers and possible dilation of the stricuture. I have discussed with the patient the indication, alternatives, and the possible risks and/or complications of the planned procedure and the anesthesia methods. We discussed the risk of perforation and requiring further procedures.  The patient appears to understand and agree to proceed.    Monitoring and Safety:  Anaesthesia monitored vital signs, BP cuff, pulse oximetry, cardiac monitor and level of consciousness until recovered.     Complications: none    OPERATIONS: The patient was placed on the table and sedated by anaesthesia. Bite block was placed. A lubricated scope was easily passed into the upper esophagus which looked normal. The distal esophagus looked normal.  The scope was passed into the stomach pouch which looked normal other than the small anastomosis at 5 mm.  The scope was pushed through the anastomosis and there was marked inflammation with a healing ulcer in the jejunum.  The scope was as passed as far down as possible and no pathology was seen. The balloon was passed through the scope and left in the anastomosis and inflated to 18 mm, held for 60 seconds then deflated and removed.     The patient tolerated the procedure well.    Plan:    Liquid

## 2025-01-21 NOTE — H&P
Eliana Stanley  1/21/2025  H&P     Eliana Stanley is a 69 y.o. 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 No more abdominal pain or nausea on the Omeprazole and Carafate and liquid diet. She is drinking 60 ounces per day and is meeting protein recommendations. Exercise: walking.    History:   11/26/2024 EGD 14 mm balloon dilation of a very tight, 3 mm anastomotic stricture with a 6 mm ulcer  10/2024 restarted Omeprazole and Carafate for mid abdominal pain  Started on Xarelto 5/2024 after superficial arm blood clots from arm IV's.    Weights:  170 lb 1/21/2025  EGD  167 lb 11/26/2024  EGD with 14 mm dilation  208 lb 5/18/2023  12 wks  230 lb 3/2/2023  6 wks  235 lb 2/23/2023  10 days  244 lb 2/13/2023         bypass  252 lb 10/13/2022       initial    Past Medical History:   Diagnosis Date    Arthritis     HTN (hypertension)     Hx of blood clots     Morbid obesity due to excess calories      Past Surgical History:   Procedure Laterality Date    CHOLECYSTECTOMY, LAPAROSCOPIC  2016    COLONOSCOPY  2018    ESOPHAGUS SURGERY      banded throat    HERNIA REPAIR      HIATAL HERNIA REPAIR N/A 2019    HYSTERECTOMY, TOTAL ABDOMINAL (CERVIX REMOVED)  2011    AMANDO-EN-Y GASTRIC BYPASS N/A 02/13/2023    GASTRIC BYPASS AMANDO-EN-Y LAPAROSCOPIC ROBOTIC XI performed by David Murdock MD at [facility] OR    UPPER GASTROINTESTINAL ENDOSCOPY N/A 11/09/2022    EGD BIOPSY performed by David Murdock MD at [facility] ENDOSCOPY    UPPER GASTROINTESTINAL ENDOSCOPY N/A 11/26/2024    ESOPHAGOGASTRODUODENOSCOPY BIOPSY performed by David Murdock MD at [facility] ENDOSCOPY    UPPER GASTROINTESTINAL ENDOSCOPY  11/26/2024    ESOPHAGOGASTRODUODENOSCOPY DILATION BALLOON performed by David Murdock MD at [facility] ENDOSCOPY       Home Medications  Prior to Visit Medications    Medication Sig Taking? Authorizing Provider   XARELTO 20 MG TABS tablet TAKE 1 TABLET BY MOUTH ONCE DAILY WITH FOOD Oral for 30 Days Yes

## 2025-01-23 LAB
COPPER SERPL-MCNC: 75.7 UG/DL (ref 80–155)
RETINYL PALMITATE: <0.02 MG/L (ref 0–0.1)
VITAMIN A LEVEL: 0.52 MG/L (ref 0.3–1.2)
VITAMIN A, INTERP: NORMAL
ZINC SERPL-MCNC: 56.6 UG/DL (ref 60–120)

## 2025-01-24 LAB — VIT B1 PYROPHOSHATE BLD-SCNC: 147 NMOL/L (ref 70–180)

## 2025-02-20 ENCOUNTER — TELEPHONE (OUTPATIENT)
Dept: BARIATRICS/WEIGHT MGMT | Age: 70
End: 2025-02-20

## 2025-02-20 NOTE — TELEPHONE ENCOUNTER
Called pt to reschedule appt. (Telephone Visit). She needs to be seen in the office. Her mailbox was full so I was unable to leave message.

## 2025-03-11 ENCOUNTER — TELEPHONE (OUTPATIENT)
Dept: BARIATRICS/WEIGHT MGMT | Age: 70
End: 2025-03-11

## 2025-04-10 ENCOUNTER — TELEPHONE (OUTPATIENT)
Dept: BARIATRICS/WEIGHT MGMT | Age: 70
End: 2025-04-10

## 2025-04-10 NOTE — TELEPHONE ENCOUNTER
Eliana did not answer when I called to confirm her appt on 4/9 and her mailbox was full so I could not LM. I called again today after she did not show and I had the same outcome.

## (undated) DEVICE — TOWEL,OR,DSP,ST,BLUE,STD,6/PK,12PK/CS: Brand: MEDLINE

## (undated) DEVICE — CANNULA SEAL

## (undated) DEVICE — COLUMN DRAPE

## (undated) DEVICE — MASK,FACE,MAXFLUIDPROTECT,SHIELD/ERLPS: Brand: MEDLINE

## (undated) DEVICE — CLOTH SURG PREP PREOPERATIVE CHLORHEXIDINE GLUC 2% READYPREP

## (undated) DEVICE — PITCHER PT 1200ML W HNDL CSR WRP

## (undated) DEVICE — GOWN,SIRUS,NONRNF,SETINSLV,XL,20/CS: Brand: MEDLINE

## (undated) DEVICE — MEDI-VAC NON-CONDUCTIVE SUCTION TUBING: Brand: CARDINAL HEALTH

## (undated) DEVICE — SEAL

## (undated) DEVICE — PACK SURG LAP CHOLE CUSTOM

## (undated) DEVICE — BLADE ES ELASTOMERIC COAT INSUL DURABLE BEND UPTO 90DEG

## (undated) DEVICE — [HIGH FLOW INSUFFLATOR,  DO NOT USE IF PACKAGE IS DAMAGED,  KEEP DRY,  KEEP AWAY FROM SUNLIGHT,  PROTECT FROM HEAT AND RADIOACTIVE SOURCES.]: Brand: PNEUMOSURE

## (undated) DEVICE — CONTAINER SPEC COLL 960ML POLYPR TRIANG GRAD INTAKE/OUTPUT

## (undated) DEVICE — SUCTION IRRIGATOR: Brand: ENDOWRIST

## (undated) DEVICE — SPONGE GZ 4IN 4IN 4 PLY N WVN AVANT

## (undated) DEVICE — ELECTRO LUBE IS A SINGLE PATIENT USE DEVICE THAT IS INTENDED TO BE USED ON ELECTROSURGICAL ELECTRODES TO REDUCE STICKING.: Brand: KEY SURGICAL ELECTRO LUBE

## (undated) DEVICE — 6 X 9  1.75MIL 4-WALL LABGUARD: Brand: MINIGRIP COMMERCIAL LLC

## (undated) DEVICE — SHEET DRAPE FULL 70X100

## (undated) DEVICE — ELECTRODE PT RET AD L9FT HI MOIST COND ADH HYDRGEL CORDED

## (undated) DEVICE — GOWN ISOLATN REG YEL M WT MULTIPLY SIDETIE LEV 2

## (undated) DEVICE — SOLUTION IRRIG 3000ML 0.9% SOD CHL USP UROMATIC PLAS CONT

## (undated) DEVICE — FORCEPS BX L240CM JAW DIA2.8MM L CAP W/ NDL MIC MESH TOOTH

## (undated) DEVICE — BLOCK BITE 60FR CAREGUARD

## (undated) DEVICE — NDL CNTR 40CT FM MAG: Brand: MEDLINE INDUSTRIES, INC.

## (undated) DEVICE — VALVE SUCTION AIR H2O HYDR H2O JET CONN STRL ORCA POD + DISP

## (undated) DEVICE — MEGA SUTURECUT ND: Brand: ENDOWRIST

## (undated) DEVICE — ADAPTER CLEANING PORPOISE CLEANING

## (undated) DEVICE — PERMANENT CAUTERY HOOK: Brand: ENDOWRIST

## (undated) DEVICE — LUBRICANT SURG JELLY ST BACTER TUBE 4.25OZ

## (undated) DEVICE — REAGENT TEST UREASE RAPD CLOTEST F/

## (undated) DEVICE — KENDALL 450 SERIES MONITORING FOAM ELECTRODE - RECTANGULAR SHAPE ( 3/PK): Brand: KENDALL

## (undated) DEVICE — STAPLER 60 RELOAD WHITE: Brand: SUREFORM

## (undated) DEVICE — ARM DRAPE

## (undated) DEVICE — MEDI-VAC YANKAUER SUCTION HANDLE W/BULBOUS TIP: Brand: CARDINAL HEALTH

## (undated) DEVICE — KIT BEDSIDE REVITAL OX 500ML

## (undated) DEVICE — MARKER,SKIN,WI/RULER AND LABELS: Brand: MEDLINE

## (undated) DEVICE — SUTURE V-LOC 180 SZ 0 L9IN ABSRB GRN GS-21 L37MM 1/2 CIR VLOCL0346

## (undated) DEVICE — ESOPHAGEAL/COLONIC/BILIARY WIREGUIDED BALLOON DILATATION CATHETER: Brand: CRE™ PRO

## (undated) DEVICE — SOLUTION IRRIG 1000ML 0.9% SOD CHL USP POUR PLAS BTL

## (undated) DEVICE — DEVICE INFL 60ML 15ATM DISPOSABLE STERIFLATE CRE

## (undated) DEVICE — BLADELESS OBTURATOR: Brand: WECK VISTA

## (undated) DEVICE — NEEDLE SPNL 22GA L3.5IN BLK HUB S STL REG WALL FIT STYL W/

## (undated) DEVICE — 4-PORT MANIFOLD: Brand: NEPTUNE 2

## (undated) DEVICE — TIP-UP FENESTRATED GRASPER: Brand: ENDOWRIST

## (undated) DEVICE — TUBING, SUCTION, 1/4" X 10', STRAIGHT: Brand: MEDLINE

## (undated) DEVICE — VESSEL SEALER EXTEND: Brand: ENDOWRIST

## (undated) DEVICE — STAPLER 60: Brand: SUREFORM

## (undated) DEVICE — COVER,LIGHT HANDLE,FLX,1/PK: Brand: MEDLINE INDUSTRIES, INC.

## (undated) DEVICE — STAPLER SHEATH: Brand: ENDOWRIST

## (undated) DEVICE — SYRINGE 20ML LL S/C 50

## (undated) DEVICE — REDUCER: Brand: ENDOWRIST

## (undated) DEVICE — APPLICATOR MEDICATED 26 CC SOLUTION HI LT ORNG CHLORAPREP

## (undated) DEVICE — TROCAR: Brand: KII SLEEVE

## (undated) DEVICE — Device: Brand: INSTRUSAFE

## (undated) DEVICE — PLUMEPORT ACTIV LAPAROSCOPIC SMOKE FILTRATION DEVICE: Brand: PLUMEPORT ACTIVE

## (undated) DEVICE — SUTURE ABSRB L6IN L37MM 0 GS-21 GRN 1/2 CIR TAPR PNT NDL VLOCL0306

## (undated) DEVICE — COVER,TABLE,44X90,STERILE: Brand: MEDLINE

## (undated) DEVICE — MASK O2 AD TB L7FT HI CONC PART N RBRTH W RESVR BG SFTY

## (undated) DEVICE — GARMENT,MEDLINE,DVT,INT,CALF,MED, GEN2: Brand: MEDLINE

## (undated) DEVICE — STAPLER 60 RELOAD BLUE: Brand: SUREFORM

## (undated) DEVICE — ESOPHAGEAL/PYLORIC/COLONIC/BILIARY WIREGUIDED BALLOON DILATATION CATHETER: Brand: CRE™ PRO

## (undated) DEVICE — MICRO TIP WIPE: Brand: DEVON

## (undated) DEVICE — NEEDLE LAP VERES 14 GAX120 MM IN124] THE OR COMPANY]

## (undated) DEVICE — JELLY,LUBE,STERILE,FLIP TOP,TUBE,4-OZ: Brand: MEDLINE

## (undated) DEVICE — GLOVE ORANGE PI 7 1/2   MSG9075

## (undated) DEVICE — DOUBLE BASIN SET: Brand: MEDLINE INDUSTRIES, INC.